# Patient Record
Sex: FEMALE | Race: WHITE | NOT HISPANIC OR LATINO
[De-identification: names, ages, dates, MRNs, and addresses within clinical notes are randomized per-mention and may not be internally consistent; named-entity substitution may affect disease eponyms.]

---

## 2019-01-07 ENCOUNTER — RECORD ABSTRACTING (OUTPATIENT)
Age: 69
End: 2019-01-07

## 2019-01-07 DIAGNOSIS — Z82.49 FAMILY HISTORY OF ISCHEMIC HEART DISEASE AND OTHER DISEASES OF THE CIRCULATORY SYSTEM: ICD-10-CM

## 2019-01-07 DIAGNOSIS — Z86.19 PERSONAL HISTORY OF OTHER INFECTIOUS AND PARASITIC DISEASES: ICD-10-CM

## 2019-01-07 DIAGNOSIS — Z87.09 PERSONAL HISTORY OF OTHER DISEASES OF THE RESPIRATORY SYSTEM: ICD-10-CM

## 2019-01-07 DIAGNOSIS — Z82.0 FAMILY HISTORY OF EPILEPSY AND OTHER DISEASES OF THE NERVOUS SYSTEM: ICD-10-CM

## 2019-01-07 DIAGNOSIS — K46.9 UNSPECIFIED ABDOMINAL HERNIA W/OUT OBSTRUCTION OR GANGRENE: ICD-10-CM

## 2019-01-07 DIAGNOSIS — Z86.718 PERSONAL HISTORY OF OTHER VENOUS THROMBOSIS AND EMBOLISM: ICD-10-CM

## 2019-01-07 DIAGNOSIS — R60.9 EDEMA, UNSPECIFIED: ICD-10-CM

## 2019-01-07 DIAGNOSIS — Z87.01 PERSONAL HISTORY OF PNEUMONIA (RECURRENT): ICD-10-CM

## 2019-01-07 DIAGNOSIS — Z87.19 PERSONAL HISTORY OF OTHER DISEASES OF THE DIGESTIVE SYSTEM: ICD-10-CM

## 2019-01-07 DIAGNOSIS — Z86.39 PERSONAL HISTORY OF OTHER ENDOCRINE, NUTRITIONAL AND METABOLIC DISEASE: ICD-10-CM

## 2019-01-07 DIAGNOSIS — H40.9 UNSPECIFIED GLAUCOMA: ICD-10-CM

## 2019-01-07 DIAGNOSIS — Z78.9 OTHER SPECIFIED HEALTH STATUS: ICD-10-CM

## 2019-01-07 DIAGNOSIS — Z86.72 PERSONAL HISTORY OF THROMBOPHLEBITIS: ICD-10-CM

## 2019-01-07 RX ORDER — LATANOPROST/PF 0.005 %
0.01 DROPS OPHTHALMIC (EYE)
Refills: 0 | Status: ACTIVE | COMMUNITY

## 2019-01-07 RX ORDER — PANTOPRAZOLE 40 MG/1
TABLET, DELAYED RELEASE ORAL
Refills: 0 | Status: ACTIVE | COMMUNITY

## 2019-01-07 RX ORDER — TIMOLOL MALEATE 5 MG/ML
SOLUTION/ DROPS OPHTHALMIC
Refills: 0 | Status: ACTIVE | COMMUNITY

## 2019-01-07 RX ORDER — ZOLPIDEM TARTRATE 5 MG/1
5 TABLET ORAL
Refills: 0 | Status: ACTIVE | COMMUNITY

## 2019-01-17 ENCOUNTER — APPOINTMENT (OUTPATIENT)
Dept: INTERNAL MEDICINE | Facility: CLINIC | Age: 69
End: 2019-01-17
Payer: MEDICARE

## 2019-01-17 VITALS
WEIGHT: 188.7 LBS | DIASTOLIC BLOOD PRESSURE: 90 MMHG | BODY MASS INDEX: 31.44 KG/M2 | HEART RATE: 59 BPM | HEIGHT: 65 IN | SYSTOLIC BLOOD PRESSURE: 170 MMHG

## 2019-01-17 VITALS — DIASTOLIC BLOOD PRESSURE: 70 MMHG | SYSTOLIC BLOOD PRESSURE: 126 MMHG

## 2019-01-17 DIAGNOSIS — Z00.00 ENCOUNTER FOR GENERAL ADULT MEDICAL EXAMINATION W/OUT ABNORMAL FINDINGS: ICD-10-CM

## 2019-01-17 DIAGNOSIS — K21.9 GASTRO-ESOPHAGEAL REFLUX DISEASE W/OUT ESOPHAGITIS: ICD-10-CM

## 2019-01-17 DIAGNOSIS — G47.00 INSOMNIA, UNSPECIFIED: ICD-10-CM

## 2019-01-17 DIAGNOSIS — I10 ESSENTIAL (PRIMARY) HYPERTENSION: ICD-10-CM

## 2019-01-17 PROCEDURE — 93000 ELECTROCARDIOGRAM COMPLETE: CPT

## 2019-01-17 PROCEDURE — G0444 DEPRESSION SCREEN ANNUAL: CPT | Mod: 59

## 2019-01-17 PROCEDURE — G0439: CPT

## 2019-01-17 PROCEDURE — 36415 COLL VENOUS BLD VENIPUNCTURE: CPT

## 2019-01-17 RX ORDER — ZOLPIDEM TARTRATE 5 MG/1
5 TABLET ORAL
Qty: 30 | Refills: 2 | Status: ACTIVE | COMMUNITY
Start: 2019-01-17 | End: 1900-01-01

## 2019-01-17 RX ORDER — METOPROLOL SUCCINATE 25 MG/1
25 TABLET, EXTENDED RELEASE ORAL
Refills: 0 | Status: ACTIVE | COMMUNITY

## 2019-01-17 RX ORDER — HYDROCHLOROTHIAZIDE 25 MG/1
25 TABLET ORAL
Qty: 90 | Refills: 2 | Status: DISCONTINUED | COMMUNITY
End: 2019-01-17

## 2019-01-17 NOTE — HEALTH RISK ASSESSMENT
[Very Good] : ~his/her~  mood as very good [One fall no injury in past year] : Patient reported one fall in the past year without injury [0] : 2) Feeling down, depressed, or hopeless: Not at all (0) [Patient reported mammogram was normal] : Patient reported mammogram was normal [Patient reported PAP Smear was normal] : Patient reported PAP Smear was normal [Patient reported bone density results were abnormal] : Patient reported bone density results were abnormal [Patient reported colonoscopy was normal] : Patient reported colonoscopy was normal [None] : None [With Significant Other] : lives with significant other [] :  [Fully functional (bathing, dressing, toileting, transferring, walking, feeding)] : Fully functional (bathing, dressing, toileting, transferring, walking, feeding) [Fully functional (using the telephone, shopping, preparing meals, housekeeping, doing laundry, using] : Fully functional and needs no help or supervision to perform IADLs (using the telephone, shopping, preparing meals, housekeeping, doing laundry, using transportation, managing medications and managing finances) [Smoke Detector] : smoke detector [Carbon Monoxide Detector] : carbon monoxide detector [Safety elements used in home] : safety elements used in home [Seat Belt] :  uses seat belt [Sunscreen] : uses sunscreen [Discussed at today's visit] : Advance Directives Discussed at today's visit [] : No [de-identified] : gyn--silvano tucker-aida [de-identified] : social [[id number]] : 0 [Change in mental status noted] : No change in mental status noted [Language] : denies difficulty with language [Behavior] : denies difficulty with behavior [Learning/Retaining New Information] : denies difficulty learning/retaining new information [Handling Complex Tasks] : denies difficulty handling complex tasks [Reasoning] : denies difficulty with reasoning [Spatial Ability and Orientation] : denies difficulty with spatial ability and orientation [Reports changes in hearing] : Reports no changes in hearing [Reports changes in vision] : Reports no changes in vision [Reports changes in dental health] : Reports no changes in dental health [Guns at Home] : no guns at home [Travel to Developing Areas] : does not  travel to developing areas [TB Exposure] : is not being exposed to tuberculosis [Caregiver Concerns] : does not have caregiver concerns [MammogramDate] : 12/18 [PapSmearDate] : 11/18 [BoneDensityDate] : 12/18 [ColonoscopyDate] : 01/14 [ColonoscopyComments] : edgar

## 2019-01-17 NOTE — PHYSICAL EXAM

## 2019-01-18 LAB
ALBUMIN SERPL ELPH-MCNC: 4.2 G/DL
ALP BLD-CCNC: 109 U/L
ALT SERPL-CCNC: 7 U/L
ANION GAP SERPL CALC-SCNC: 15 MMOL/L
APPEARANCE: CLEAR
AST SERPL-CCNC: 19 U/L
BACTERIA: NEGATIVE
BASOPHILS # BLD AUTO: 0.05 K/UL
BASOPHILS NFR BLD AUTO: 0.8 %
BILIRUB SERPL-MCNC: 0.7 MG/DL
BILIRUBIN URINE: NEGATIVE
BLOOD URINE: ABNORMAL
BUN SERPL-MCNC: 8 MG/DL
CALCIUM SERPL-MCNC: 9.6 MG/DL
CHLORIDE SERPL-SCNC: 102 MMOL/L
CHOLEST SERPL-MCNC: 195 MG/DL
CHOLEST/HDLC SERPL: 2 RATIO
CO2 SERPL-SCNC: 23 MMOL/L
COLOR: YELLOW
CREAT SERPL-MCNC: 0.69 MG/DL
EOSINOPHIL # BLD AUTO: 0.33 K/UL
EOSINOPHIL NFR BLD AUTO: 5 %
GLUCOSE QUALITATIVE U: NEGATIVE MG/DL
GLUCOSE SERPL-MCNC: 90 MG/DL
HBA1C MFR BLD HPLC: 5.4 %
HCT VFR BLD CALC: 41.3 %
HDLC SERPL-MCNC: 98 MG/DL
HGB BLD-MCNC: 13.5 G/DL
HYALINE CASTS: 2 /LPF
IMM GRANULOCYTES NFR BLD AUTO: 0.3 %
KETONES URINE: NEGATIVE
LDLC SERPL CALC-MCNC: 79 MG/DL
LEUKOCYTE ESTERASE URINE: NEGATIVE
LYMPHOCYTES # BLD AUTO: 1.59 K/UL
LYMPHOCYTES NFR BLD AUTO: 24.2 %
MAN DIFF?: NORMAL
MCHC RBC-ENTMCNC: 32.7 GM/DL
MCHC RBC-ENTMCNC: 32.9 PG
MCV RBC AUTO: 100.7 FL
MICROSCOPIC-UA: NORMAL
MONOCYTES # BLD AUTO: 0.45 K/UL
MONOCYTES NFR BLD AUTO: 6.8 %
NEUTROPHILS # BLD AUTO: 4.13 K/UL
NEUTROPHILS NFR BLD AUTO: 62.9 %
NITRITE URINE: NEGATIVE
PH URINE: 7
PLATELET # BLD AUTO: 288 K/UL
POTASSIUM SERPL-SCNC: 4.3 MMOL/L
PROT SERPL-MCNC: 7.1 G/DL
PROTEIN URINE: NEGATIVE MG/DL
RBC # BLD: 4.1 M/UL
RBC # FLD: 15.1 %
RED BLOOD CELLS URINE: 1 /HPF
SODIUM SERPL-SCNC: 140 MMOL/L
SPECIFIC GRAVITY URINE: 1
SQUAMOUS EPITHELIAL CELLS: 0 /HPF
TRIGL SERPL-MCNC: 90 MG/DL
TSH SERPL-ACNC: 3.17 UIU/ML
UROBILINOGEN URINE: NEGATIVE MG/DL
WBC # FLD AUTO: 6.57 K/UL
WHITE BLOOD CELLS URINE: 0 /HPF

## 2019-03-01 ENCOUNTER — RX RENEWAL (OUTPATIENT)
Age: 69
End: 2019-03-01

## 2019-03-01 RX ORDER — METOPROLOL SUCCINATE 25 MG/1
25 TABLET, EXTENDED RELEASE ORAL
Qty: 90 | Refills: 2 | Status: ACTIVE | COMMUNITY
Start: 2019-03-01 | End: 1900-01-01

## 2019-03-29 ENCOUNTER — RX RENEWAL (OUTPATIENT)
Age: 69
End: 2019-03-29

## 2019-03-29 RX ORDER — LOSARTAN POTASSIUM 100 MG/1
100 TABLET, FILM COATED ORAL DAILY
Qty: 90 | Refills: 2 | Status: ACTIVE | COMMUNITY
Start: 2019-03-29 | End: 1900-01-01

## 2019-04-22 ENCOUNTER — APPOINTMENT (OUTPATIENT)
Dept: INTERNAL MEDICINE | Facility: CLINIC | Age: 69
End: 2019-04-22

## 2019-04-25 ENCOUNTER — APPOINTMENT (OUTPATIENT)
Dept: INTERNAL MEDICINE | Facility: CLINIC | Age: 69
End: 2019-04-25

## 2019-06-10 ENCOUNTER — RX RENEWAL (OUTPATIENT)
Age: 69
End: 2019-06-10

## 2019-07-22 ENCOUNTER — RX RENEWAL (OUTPATIENT)
Age: 69
End: 2019-07-22

## 2019-08-16 ENCOUNTER — RX RENEWAL (OUTPATIENT)
Age: 69
End: 2019-08-16

## 2019-08-16 RX ORDER — PANTOPRAZOLE 40 MG/1
40 TABLET, DELAYED RELEASE ORAL DAILY
Qty: 90 | Refills: 0 | Status: ACTIVE | COMMUNITY
Start: 2019-06-10 | End: 1900-01-01

## 2019-12-02 ENCOUNTER — RX RENEWAL (OUTPATIENT)
Age: 69
End: 2019-12-02

## 2020-01-06 ENCOUNTER — RX RENEWAL (OUTPATIENT)
Age: 70
End: 2020-01-06

## 2020-12-11 ENCOUNTER — TRANSCRIPTION ENCOUNTER (OUTPATIENT)
Age: 70
End: 2020-12-11

## 2021-01-18 ENCOUNTER — APPOINTMENT (OUTPATIENT)
Dept: DISASTER EMERGENCY | Facility: CLINIC | Age: 71
End: 2021-01-18

## 2021-01-18 DIAGNOSIS — Z01.818 ENCOUNTER FOR OTHER PREPROCEDURAL EXAMINATION: ICD-10-CM

## 2021-01-20 LAB — SARS-COV-2 N GENE NPH QL NAA+PROBE: NOT DETECTED

## 2021-01-21 ENCOUNTER — INPATIENT (INPATIENT)
Facility: HOSPITAL | Age: 71
LOS: 3 days | Discharge: HOME | DRG: 310 | End: 2021-01-25
Attending: HOSPITALIST | Admitting: INTERNAL MEDICINE
Payer: MEDICARE

## 2021-01-21 VITALS
TEMPERATURE: 97 F | OXYGEN SATURATION: 100 % | DIASTOLIC BLOOD PRESSURE: 99 MMHG | RESPIRATION RATE: 20 BRPM | HEART RATE: 112 BPM | HEIGHT: 64 IN | SYSTOLIC BLOOD PRESSURE: 152 MMHG | WEIGHT: 199.08 LBS

## 2021-01-21 DIAGNOSIS — Z98.890 OTHER SPECIFIED POSTPROCEDURAL STATES: Chronic | ICD-10-CM

## 2021-01-21 DIAGNOSIS — I48.91 UNSPECIFIED ATRIAL FIBRILLATION: Chronic | ICD-10-CM

## 2021-01-21 DIAGNOSIS — I48.91 UNSPECIFIED ATRIAL FIBRILLATION: ICD-10-CM

## 2021-01-21 DIAGNOSIS — Z98.84 BARIATRIC SURGERY STATUS: Chronic | ICD-10-CM

## 2021-01-21 LAB
ALBUMIN SERPL ELPH-MCNC: 3 G/DL — LOW (ref 3.3–5)
ALP SERPL-CCNC: 66 U/L — SIGNIFICANT CHANGE UP (ref 40–120)
ALT FLD-CCNC: 16 U/L — SIGNIFICANT CHANGE UP (ref 12–78)
ANION GAP SERPL CALC-SCNC: 3 MMOL/L — LOW (ref 5–17)
AST SERPL-CCNC: 12 U/L — LOW (ref 15–37)
BILIRUB SERPL-MCNC: 0.5 MG/DL — SIGNIFICANT CHANGE UP (ref 0.2–1.2)
BUN SERPL-MCNC: 10 MG/DL — SIGNIFICANT CHANGE UP (ref 7–23)
CALCIUM SERPL-MCNC: 8 MG/DL — LOW (ref 8.5–10.1)
CHLORIDE SERPL-SCNC: 109 MMOL/L — HIGH (ref 96–108)
CO2 SERPL-SCNC: 28 MMOL/L — SIGNIFICANT CHANGE UP (ref 22–31)
CREAT SERPL-MCNC: 0.8 MG/DL — SIGNIFICANT CHANGE UP (ref 0.5–1.3)
GLUCOSE SERPL-MCNC: 97 MG/DL — SIGNIFICANT CHANGE UP (ref 70–99)
HCT VFR BLD CALC: 36.1 % — SIGNIFICANT CHANGE UP (ref 34.5–45)
HGB BLD-MCNC: 11.8 G/DL — SIGNIFICANT CHANGE UP (ref 11.5–15.5)
MCHC RBC-ENTMCNC: 32.7 GM/DL — SIGNIFICANT CHANGE UP (ref 32–36)
MCHC RBC-ENTMCNC: 33.1 PG — SIGNIFICANT CHANGE UP (ref 27–34)
MCV RBC AUTO: 101.1 FL — HIGH (ref 80–100)
PLATELET # BLD AUTO: 251 K/UL — SIGNIFICANT CHANGE UP (ref 150–400)
POTASSIUM SERPL-MCNC: 4.4 MMOL/L — SIGNIFICANT CHANGE UP (ref 3.5–5.3)
POTASSIUM SERPL-SCNC: 4.4 MMOL/L — SIGNIFICANT CHANGE UP (ref 3.5–5.3)
PROT SERPL-MCNC: 6.4 GM/DL — SIGNIFICANT CHANGE UP (ref 6–8.3)
RBC # BLD: 3.57 M/UL — LOW (ref 3.8–5.2)
RBC # FLD: 14.6 % — HIGH (ref 10.3–14.5)
SODIUM SERPL-SCNC: 140 MMOL/L — SIGNIFICANT CHANGE UP (ref 135–145)
WBC # BLD: 6.56 K/UL — SIGNIFICANT CHANGE UP (ref 3.8–10.5)
WBC # FLD AUTO: 6.56 K/UL — SIGNIFICANT CHANGE UP (ref 3.8–10.5)

## 2021-01-21 PROCEDURE — 94640 AIRWAY INHALATION TREATMENT: CPT

## 2021-01-21 PROCEDURE — 99223 1ST HOSP IP/OBS HIGH 75: CPT

## 2021-01-21 PROCEDURE — 93010 ELECTROCARDIOGRAM REPORT: CPT | Mod: 76

## 2021-01-21 PROCEDURE — 85025 COMPLETE CBC W/AUTO DIFF WBC: CPT

## 2021-01-21 PROCEDURE — 82746 ASSAY OF FOLIC ACID SERUM: CPT

## 2021-01-21 PROCEDURE — 84100 ASSAY OF PHOSPHORUS: CPT

## 2021-01-21 PROCEDURE — 83036 HEMOGLOBIN GLYCOSYLATED A1C: CPT

## 2021-01-21 PROCEDURE — 86769 SARS-COV-2 COVID-19 ANTIBODY: CPT

## 2021-01-21 PROCEDURE — 36415 COLL VENOUS BLD VENIPUNCTURE: CPT

## 2021-01-21 PROCEDURE — 93005 ELECTROCARDIOGRAM TRACING: CPT

## 2021-01-21 PROCEDURE — 80048 BASIC METABOLIC PNL TOTAL CA: CPT

## 2021-01-21 PROCEDURE — 86803 HEPATITIS C AB TEST: CPT

## 2021-01-21 PROCEDURE — 83735 ASSAY OF MAGNESIUM: CPT

## 2021-01-21 PROCEDURE — 84443 ASSAY THYROID STIM HORMONE: CPT

## 2021-01-21 PROCEDURE — 82607 VITAMIN B-12: CPT

## 2021-01-21 PROCEDURE — 84484 ASSAY OF TROPONIN QUANT: CPT

## 2021-01-21 RX ORDER — FOLIC ACID 0.8 MG
1 TABLET ORAL DAILY
Refills: 0 | Status: DISCONTINUED | OUTPATIENT
Start: 2021-01-21 | End: 2021-01-25

## 2021-01-21 RX ORDER — PANTOPRAZOLE SODIUM 20 MG/1
40 TABLET, DELAYED RELEASE ORAL ONCE
Refills: 0 | Status: DISCONTINUED | OUTPATIENT
Start: 2021-01-21 | End: 2021-01-21

## 2021-01-21 RX ORDER — FERROUS SULFATE 325(65) MG
1 TABLET ORAL
Qty: 0 | Refills: 0 | DISCHARGE

## 2021-01-21 RX ORDER — ZOLPIDEM TARTRATE 10 MG/1
5 TABLET ORAL ONCE
Refills: 0 | Status: DISCONTINUED | OUTPATIENT
Start: 2021-01-21 | End: 2021-01-21

## 2021-01-21 RX ORDER — BUDESONIDE AND FORMOTEROL FUMARATE DIHYDRATE 160; 4.5 UG/1; UG/1
2 AEROSOL RESPIRATORY (INHALATION)
Refills: 0 | Status: DISCONTINUED | OUTPATIENT
Start: 2021-01-21 | End: 2021-01-25

## 2021-01-21 RX ORDER — ONDANSETRON 8 MG/1
4 TABLET, FILM COATED ORAL ONCE
Refills: 0 | Status: DISCONTINUED | OUTPATIENT
Start: 2021-01-21 | End: 2021-01-25

## 2021-01-21 RX ORDER — ZOLPIDEM TARTRATE 10 MG/1
5 TABLET ORAL AT BEDTIME
Refills: 0 | Status: DISCONTINUED | OUTPATIENT
Start: 2021-01-21 | End: 2021-01-25

## 2021-01-21 RX ORDER — APIXABAN 2.5 MG/1
5 TABLET, FILM COATED ORAL EVERY 12 HOURS
Refills: 0 | Status: DISCONTINUED | OUTPATIENT
Start: 2021-01-21 | End: 2021-01-25

## 2021-01-21 RX ORDER — METOPROLOL TARTRATE 50 MG
150 TABLET ORAL ONCE
Refills: 0 | Status: DISCONTINUED | OUTPATIENT
Start: 2021-01-21 | End: 2021-01-21

## 2021-01-21 RX ORDER — LATANOPROST 0.05 MG/ML
1 SOLUTION/ DROPS OPHTHALMIC; TOPICAL AT BEDTIME
Refills: 0 | Status: DISCONTINUED | OUTPATIENT
Start: 2021-01-21 | End: 2021-01-25

## 2021-01-21 RX ORDER — DOFETILIDE 0.25 MG/1
250 CAPSULE ORAL EVERY 12 HOURS
Refills: 0 | Status: DISCONTINUED | OUTPATIENT
Start: 2021-01-21 | End: 2021-01-25

## 2021-01-21 RX ORDER — METOPROLOL TARTRATE 50 MG
150 TABLET ORAL DAILY
Refills: 0 | Status: DISCONTINUED | OUTPATIENT
Start: 2021-01-21 | End: 2021-01-25

## 2021-01-21 RX ORDER — LATANOPROST 0.05 MG/ML
1 SOLUTION/ DROPS OPHTHALMIC; TOPICAL ONCE
Refills: 0 | Status: DISCONTINUED | OUTPATIENT
Start: 2021-01-21 | End: 2021-01-21

## 2021-01-21 RX ORDER — VALSARTAN 80 MG/1
160 TABLET ORAL DAILY
Refills: 0 | Status: DISCONTINUED | OUTPATIENT
Start: 2021-01-21 | End: 2021-01-25

## 2021-01-21 RX ORDER — BUDESONIDE AND FORMOTEROL FUMARATE DIHYDRATE 160; 4.5 UG/1; UG/1
2 AEROSOL RESPIRATORY (INHALATION) ONCE
Refills: 0 | Status: DISCONTINUED | OUTPATIENT
Start: 2021-01-21 | End: 2021-01-21

## 2021-01-21 RX ORDER — PANTOPRAZOLE SODIUM 20 MG/1
40 TABLET, DELAYED RELEASE ORAL
Refills: 0 | Status: DISCONTINUED | OUTPATIENT
Start: 2021-01-21 | End: 2021-01-25

## 2021-01-21 RX ORDER — ACETAMINOPHEN 500 MG
650 TABLET ORAL ONCE
Refills: 0 | Status: DISCONTINUED | OUTPATIENT
Start: 2021-01-21 | End: 2021-01-25

## 2021-01-21 RX ADMIN — LATANOPROST 1 DROP(S): 0.05 SOLUTION/ DROPS OPHTHALMIC; TOPICAL at 22:25

## 2021-01-21 RX ADMIN — ZOLPIDEM TARTRATE 5 MILLIGRAM(S): 10 TABLET ORAL at 22:25

## 2021-01-21 RX ADMIN — APIXABAN 5 MILLIGRAM(S): 2.5 TABLET, FILM COATED ORAL at 22:23

## 2021-01-21 RX ADMIN — DOFETILIDE 250 MICROGRAM(S): 0.25 CAPSULE ORAL at 19:25

## 2021-01-21 NOTE — H&P ADULT - NSHPLABSRESULTS_GEN_ALL_CORE
11.8   6.56  )-----------( 251      ( 21 Jan 2021 11:14 )             36.1     01-21    140  |  109<H>  |  10  ----------------------------<  97  4.4   |  28  |  0.80    Ca    8.0<L>      21 Jan 2021 11:14  Mg     2.1     01-21    TPro  6.4  /  Alb  3.0<L>  /  TBili  0.5  /  DBili  x   /  AST  12<L>  /  ALT  16  /  AlkPhos  66  01-21    CAPILLARY BLOOD GLUCOSE

## 2021-01-21 NOTE — CONSULT NOTE ADULT - ASSESSMENT
Afib with RVR - in SR  Toprol xl decreased to once daily   hold tomorrow am dose if she is bradycardic.  Tikosyn to be started tonight.  Spoke with Dr Up  close monitoring of the renal function and electrolytes.  Goal potassium of 4 and magnesium of 2.     HTN- bp stable.    Other medical issues- Management per primary team.   Thank you for allowing me to participate in the care of this patient. Please feel free to contact me with any questions.

## 2021-01-21 NOTE — CONSULT NOTE ADULT - SUBJECTIVE AND OBJECTIVE BOX
Patient is a 70y old  Female who presents with a chief complaint of AFIB cardioversion.       HPI:  71 y/o female with PMHX of PAF on eliquis, HTN, HLD, and obesity s/p gastric bypass surgery who presented to  for planned LAZ cardioversion for AFIB.  Patient was recently dx with AFIB in 2021; however, patient has been having ongoing palpitations for the last few months.   Pt was evaluated by me as outpt and she was poorly rate controlled with max dose rate control agents.    Pt this am was shocked with 150J and she revered back to afib again and underwent another 150J CV.  Spoke with EP team to evaluate her for tikosyn load.    N oCP or SOB.       PAST MEDICAL & SURGICAL HISTORY:  GERD (gastroesophageal reflux disease)  Macular degeneration  Phlebitis and thrombophlebitis of lower extremity  Glaucoma  Hyperlipidemia, unspecified hyperlipidemia type  Essential hypertension  History of breast lift  H/O abdominoplasty  History of ovarian cystectomy  Atrial fibrillation status post cardioversion  H/O vein stripping  History of gastric bypass    FAMILY HISTORY:  Mother:   young age cerebral hemorrhage  Father:   in 60's-- extensive hx of CAD starting in 40's.      Social History:    Rare wine intake  No tob  Lives at home with family    Allergies:    fentanyl (Other)  narcotic analgesics (Other)    Home Medications:  albuterol:  (2021 12:56)  Eliquis 5 mg oral tablet: 1 tab(s) orally 2 times a day (2021 12:56)  folic acid:  (2021 12:56)  latanoprost 0.005% ophthalmic solution: 1 drop(s) to each affected eye once a day (in the evening) (2021 12:56)  magnesium:  (2021 12:56)  Metoprolol Succinate ER: 150 milligram(s) orally 2 times a day (2021 12:56)  pantoprazole 40 mg oral delayed release tablet: 1 tab(s) orally once a day (2021 12:56)  Prolia 60 mg/mL subcutaneous solution: subcutaneous every 6 months (2021 12:56)  Symbicort 160 mcg-4.5 mcg/inh inhalation aerosol: 2 puff(s) inhaled 2 times a day, As Needed (2021 12:56)  valsartan 160 mg oral tablet: 1 tab(s) orally once a day (2021 12:56)  Vitamin B12: orally once a day (2021 12:56)  Vitamin D3 1000 intl units (25 mcg) oral tablet: orally once a day (2021 12:56)  Zinc: orally once a day (2021 12:56)  zolpidem 5 mg oral tablet: 1 tab(s) orally once a day (at bedtime), As Needed (2021 12:56)     (2021 13:53)      PAST MEDICAL & SURGICAL HISTORY:  GERD (gastroesophageal reflux disease)    Macular degeneration    Phlebitis and thrombophlebitis of lower extremity    Glaucoma    Hyperlipidemia, unspecified hyperlipidemia type    Essential hypertension    History of breast lift    H/O abdominoplasty    History of ovarian cystectomy    Atrial fibrillation status post cardioversion    H/O vein stripping    History of gastric bypass        MEDICATIONS  (STANDING):  apixaban 5 milliGRAM(s) Oral every 12 hours  budesonide 160 MICROgram(s)/formoterol 4.5 MICROgram(s) Inhaler 2 Puff(s) Inhalation two times a day  dofetilide 250 MICROGram(s) Oral every 12 hours  folic acid 1 milliGRAM(s) Oral daily  latanoprost 0.005% Ophthalmic Solution 1 Drop(s) Both EYES at bedtime  metoprolol succinate  milliGRAM(s) Oral daily  pantoprazole    Tablet 40 milliGRAM(s) Oral before breakfast  valsartan 160 milliGRAM(s) Oral daily    MEDICATIONS  (PRN):  acetaminophen   Tablet .. 650 milliGRAM(s) Oral Once PRN Mild Pain (1 - 3)  aluminum hydroxide/magnesium hydroxide/simethicone Suspension 30 milliLiter(s) Oral Once PRN Dyspepsia  ondansetron Injectable 4 milliGRAM(s) IV Push Once PRN Nausea  zolpidem 5 milliGRAM(s) Oral at bedtime PRN Insomnia      FAMILY HISTORY:      SOCIAL HISTORY: no recent smoking     REVIEW OF SYSTEMS:  CONSTITUTIONAL:    No fatigue, malaise, lethargy.  No fever or chills.  RESPIRATORY:  No cough.  No wheeze.  No hemoptysis.  No shortness of breath.  CARDIOVASCULAR:  No chest pains.  No palpitations. No shortness of breath, No orthopnea or PND.  GASTROINTESTINAL:  No abdominal pain.  No nausea or vomiting.    GENITOURINARY:    No hematuria.    MUSCULOSKELETAL:  No musculoskeletal pain.  No joint swelling.  No arthritis.  NEUROLOGICAL:  No tingling or numbness or weakness.  PSYCHIATRIC:  No confusion  SKIN:  No rashes.            Vital Signs Last 24 Hrs  T(C): 36.7 (2021 16:48), Max: 36.7 (2021 16:48)  T(F): 98 (2021 16:48), Max: 98 (2021 16:48)  HR: 68 (2021 16:48) (56 - 112)  BP: 128/83 (2021 16:48) (102/63 - 152/99)  BP(mean): --  RR: 18 (2021 16:48) (15 - 20)  SpO2: 98% (2021 16:48) (96% - 100%)    PHYSICAL EXAM-    Constitutional: no acute distress     Head: Head is normocephalic and atraumatic.      Neck: No jugular venous distention. No audible carotid bruits. There are strong carotid pulses bilaterally. No JVD.     Cardiovascular: Regular rate and rhythm without S3, S4. No murmurs or rubs are appreciated.      Respiratory: Breathsounds are normal. No rales. No wheezing.    Abdomen: Soft, nontender, nondistended with positive bowel sounds.      Extremity: No tenderness. No  pitting edema     Neurologic: The patient is alert and oriented.      Skin: No rash, no obvious lesions noted.      Psychiatric: The patient appears to be emotionally stable.      INTERPRETATION OF TELEMETRY: SR     ECG: Sinus rythm , first degree     I&O's Detail      LABS:                        11.8   6.56  )-----------( 251      ( 2021 11:14 )             36.1         140  |  109<H>  |  10  ----------------------------<  97  4.4   |  28  |  0.80    Ca    8.0<L>      2021 11:14  Mg     2.1         TPro  6.4  /  Alb  3.0<L>  /  TBili  0.5  /  DBili  x   /  AST  12<L>  /  ALT  16  /  AlkPhos  66              I&O's Summary    BNP  RADIOLOGY & ADDITIONAL STUDIES:

## 2021-01-21 NOTE — H&P ADULT - NSHPREVIEWOFSYSTEMS_GEN_ALL_CORE
ROS:  General:  No fevers, chills, or unexplained weight loss  Skin: No rash or bothersome skin lesions  Musculoskeletal: No arthalgias, myalgias or joint swelling  Eyes: No visual changes or eye pain  Ears: No hearing loss , otorrhea or ear pain  Nose, Mouth, Throat: No nasal congestion, rhinorrhea, oral lesions, postnasal drip or sore throat  Cardio: palpitations  Respiratory: LUO  GI: No diarrhea, constipation, blood in stools, abdominal pain, vomiting or heartburn  : No urinary frequency, hematuria, incontinence, or dysuria  Neurologic: No headaches, parasthesias, confusion, dysarthria or gait instability  Psychiatric:  No anxiety or depression  Lymphatic:  No easy bruising, easy bleeding or swollen glands  Allergic: No itching, sneezing , watery eyes, clear rhinorrhea or recurrent infections

## 2021-01-21 NOTE — H&P CARDIOLOGY - PSH
Atrial fibrillation status post cardioversion    H/O abdominoplasty    H/O vein stripping    History of breast lift    History of gastric bypass    History of ovarian cystectomy

## 2021-01-21 NOTE — H&P ADULT - NSHPPHYSICALEXAM_GEN_ALL_CORE
PEx  T(C): 36.3 (01-21-21 @ 08:42), Max: 36.3 (01-21-21 @ 08:42)  HR: 56 (01-21-21 @ 15:04) (56 - 112)  BP: 115/66 (01-21-21 @ 15:04) (102/63 - 152/99)  RR: 16 (01-21-21 @ 15:04) (15 - 20)  SpO2: 96% (01-21-21 @ 15:04) (96% - 100%)    General:     Well appearing, well nourished in no distress, no identifying marks , scars, or tattoos.  Skin: no rash or prominent lesions  Head: normocephalic, atraumatic     Sinuses: non-tender  Nose: no external lesions, mucosa non-inflamed, septum and turbinates normal  Throat: no erythema, exudates or lesions.  Neck: Supple without lymphadenopathy. Thyroid no thyromegaly, no palpable thyroid nodules, no palpable nodules or masses, carotid arteries no bruits.   Breasts: No palpable masses or lesions.  Heart: RRR, no murmur or gallop.  Normal S1, S2.  No S3, S4.   Lungs: CTA bilaterally, no wheezes, rhonchi, rales.  Breathing unlabored.   Chest wall: Normal insp   Abdomen:  Soft, NT/ND, normal bowel sounds, no HSM, no masses.  No peritoneal signs.   Back: spine normal without deformity or tenderness.  Normal ROM   : Exam normal.  no inguinal hernias.  Extremities:  1+ edema  Musculoskeletal: Normal gait and station. No decreased range of motion, instability, atrophy or abnormal strength or tone in the head, neck, spine, ribs, pelvis or extremities.   Neurologic: CN 2-12 normal. Sensation to pain, touch and proprioception normal. DTRs normal in upper and lower extremities. No pathologic reflexes.  Motor normal.  Psychiatric: Oriented X3, intact recent and remote memory, judgement and insight, normal mood and affect.

## 2021-01-21 NOTE — H&P ADULT - HISTORY OF PRESENT ILLNESS
69 y/o female with PMHX of PAF on eliquis, HTN, HLD, and obesity s/p gastric bypass surgery who presented to  for planned LAZ cardioversion for AFIB.  Patient was recently dx with AFIB in JAN 2021.      69 yo female PMH HTN, HLD, obesity h/o gastric bypass, new onset afib diagnosed in beginning of this month,  presented for outpatient LAZ guided cardioversion with Dr Godoy.  She reports for the past two months increased acid reflux and feeling chest pounding with sob and LUO.  She underwent LAZ/Cardioversion this morning with conversion to sinus rhythm.   EP consulted for evaluation for oral antiarrhythmic agent.    71 y/o female with PMHX of PAF on eliquis, HTN, HLD, and obesity s/p gastric bypass surgery who presented to  for planned LAZ cardioversion for AFIB.  Patient was recently dx with AFIB in 2021; however, patient has been having ongoing palpitations for the last few months.  Patient notes she has a hx of GERD and was having palpitations/ epigastric pain off and on for months.  Patient was also having intermittent SOB with exertion.  Patient ultimately found to have PAF and was started on Eliquis by Dr. Turner and was set up for LAZ/ Cardioversion due to persistent AFIB on rate control agents.  Patient had successful cardioversion this morning but EP was consulted with concern of recurrent AFIB.  Decision was made for admission for starting TIKOSYN.        PAST MEDICAL & SURGICAL HISTORY:  GERD (gastroesophageal reflux disease)  Macular degeneration  Phlebitis and thrombophlebitis of lower extremity  Glaucoma  Hyperlipidemia, unspecified hyperlipidemia type  Essential hypertension  History of breast lift  H/O abdominoplasty  History of ovarian cystectomy  Atrial fibrillation status post cardioversion  H/O vein stripping  History of gastric bypass    FAMILY HISTORY:  Mother:   young age cerebral hemorrhage  Father:   in 60's-- extensive hx of CAD starting in 40's.      Social History:    Rare wine intake  No tob  Lives at home with family    Allergies:    fentanyl (Other)  narcotic analgesics (Other)    Home Medications:  albuterol:  (2021 12:56)  Eliquis 5 mg oral tablet: 1 tab(s) orally 2 times a day (2021 12:56)  folic acid:  (2021 12:56)  latanoprost 0.005% ophthalmic solution: 1 drop(s) to each affected eye once a day (in the evening) (2021 12:56)  magnesium:  (2021 12:56)  Metoprolol Succinate ER: 150 milligram(s) orally 2 times a day (2021 12:56)  pantoprazole 40 mg oral delayed release tablet: 1 tab(s) orally once a day (2021 12:56)  Prolia 60 mg/mL subcutaneous solution: subcutaneous every 6 months (2021 12:56)  Symbicort 160 mcg-4.5 mcg/inh inhalation aerosol: 2 puff(s) inhaled 2 times a day, As Needed (2021 12:56)  valsartan 160 mg oral tablet: 1 tab(s) orally once a day (2021 12:56)  Vitamin B12: orally once a day (2021 12:56)  Vitamin D3 1000 intl units (25 mcg) oral tablet: orally once a day (2021 12:56)  Zinc: orally once a day (2021 12:56)  zolpidem 5 mg oral tablet: 1 tab(s) orally once a day (at bedtime), As Needed (2021 12:56)

## 2021-01-21 NOTE — H&P CARDIOLOGY - PMH
Essential hypertension    GERD (gastroesophageal reflux disease)    Glaucoma    Hyperlipidemia, unspecified hyperlipidemia type    Macular degeneration    Phlebitis and thrombophlebitis of lower extremity

## 2021-01-21 NOTE — H&P CARDIOLOGY - HISTORY OF PRESENT ILLNESS
69 yo female PMH HTN, HLD, obesity h/o gastric bypass, new onset afib diagnosed in beginning of this month,  presented for outpatient LAZ guided cardioversion with Dr Godoy.  She reports for the past two months increased acid reflux and feeling chest pounding with sob and LUO.  She underwent LAZ/Cardioversion this morning with conversion to sinus rhythm.   EP consulted for evaluation for oral antiarrhythmic agent.

## 2021-01-21 NOTE — H&P CARDIOLOGY - COMMENTS
71 yo female with HTN, HLD, h/o gastric bypass, new diagnosis of PAF in January 2021 (symptom onset possibly since november 2020) started on eliquis.  now s/p LAZ/Cardioversion, with conversion to sinus rhythm heart rate in 60s.   will recommend antiarrhythmic agent Tikosyn for sinus rhythm maintenance.   chadsvasc 3, continue Eliquis for stroke prophylaxis    reduce metoprolol to 150mg once daily in the morning  CBC/BMP/Mg stat - replete electrolytes as needed  BMP daily  EKG reviewed  admit to tele under Hospitalist service  will start Tikosyn 250mcg bid with EKG 2 hrs after each dose to monitor QT interval  call EP for dose adjustment if QTc greater than 500ms

## 2021-01-22 LAB
A1C WITH ESTIMATED AVERAGE GLUCOSE RESULT: 5.3 % — SIGNIFICANT CHANGE UP (ref 4–5.6)
ANION GAP SERPL CALC-SCNC: 5 MMOL/L — SIGNIFICANT CHANGE UP (ref 5–17)
BASOPHILS # BLD AUTO: 0.05 K/UL — SIGNIFICANT CHANGE UP (ref 0–0.2)
BASOPHILS NFR BLD AUTO: 0.7 % — SIGNIFICANT CHANGE UP (ref 0–2)
BUN SERPL-MCNC: 10 MG/DL — SIGNIFICANT CHANGE UP (ref 7–23)
CALCIUM SERPL-MCNC: 7.6 MG/DL — LOW (ref 8.5–10.1)
CHLORIDE SERPL-SCNC: 106 MMOL/L — SIGNIFICANT CHANGE UP (ref 96–108)
CO2 SERPL-SCNC: 25 MMOL/L — SIGNIFICANT CHANGE UP (ref 22–31)
CREAT SERPL-MCNC: 0.87 MG/DL — SIGNIFICANT CHANGE UP (ref 0.5–1.3)
EOSINOPHIL # BLD AUTO: 0.17 K/UL — SIGNIFICANT CHANGE UP (ref 0–0.5)
EOSINOPHIL NFR BLD AUTO: 2.5 % — SIGNIFICANT CHANGE UP (ref 0–6)
ESTIMATED AVERAGE GLUCOSE: 105 MG/DL — SIGNIFICANT CHANGE UP (ref 68–114)
FOLATE SERPL-MCNC: >20 NG/ML — SIGNIFICANT CHANGE UP
GLUCOSE SERPL-MCNC: 146 MG/DL — HIGH (ref 70–99)
HCT VFR BLD CALC: 35.7 % — SIGNIFICANT CHANGE UP (ref 34.5–45)
HCV AB S/CO SERPL IA: 0.89 S/CO — SIGNIFICANT CHANGE UP (ref 0–0.99)
HCV AB SERPL-IMP: SIGNIFICANT CHANGE UP
HGB BLD-MCNC: 11.6 G/DL — SIGNIFICANT CHANGE UP (ref 11.5–15.5)
IMM GRANULOCYTES NFR BLD AUTO: 0.1 % — SIGNIFICANT CHANGE UP (ref 0–1.5)
LYMPHOCYTES # BLD AUTO: 1.26 K/UL — SIGNIFICANT CHANGE UP (ref 1–3.3)
LYMPHOCYTES # BLD AUTO: 18.9 % — SIGNIFICANT CHANGE UP (ref 13–44)
MAGNESIUM SERPL-MCNC: 2 MG/DL — SIGNIFICANT CHANGE UP (ref 1.6–2.6)
MCHC RBC-ENTMCNC: 32.5 GM/DL — SIGNIFICANT CHANGE UP (ref 32–36)
MCHC RBC-ENTMCNC: 32.7 PG — SIGNIFICANT CHANGE UP (ref 27–34)
MCV RBC AUTO: 100.6 FL — HIGH (ref 80–100)
MONOCYTES # BLD AUTO: 0.56 K/UL — SIGNIFICANT CHANGE UP (ref 0–0.9)
MONOCYTES NFR BLD AUTO: 8.4 % — SIGNIFICANT CHANGE UP (ref 2–14)
NEUTROPHILS # BLD AUTO: 4.63 K/UL — SIGNIFICANT CHANGE UP (ref 1.8–7.4)
NEUTROPHILS NFR BLD AUTO: 69.4 % — SIGNIFICANT CHANGE UP (ref 43–77)
PHOSPHATE SERPL-MCNC: 2.5 MG/DL — SIGNIFICANT CHANGE UP (ref 2.5–4.5)
PLATELET # BLD AUTO: 210 K/UL — SIGNIFICANT CHANGE UP (ref 150–400)
POTASSIUM SERPL-MCNC: 3.8 MMOL/L — SIGNIFICANT CHANGE UP (ref 3.5–5.3)
POTASSIUM SERPL-SCNC: 3.8 MMOL/L — SIGNIFICANT CHANGE UP (ref 3.5–5.3)
RBC # BLD: 3.55 M/UL — LOW (ref 3.8–5.2)
RBC # FLD: 14.6 % — HIGH (ref 10.3–14.5)
SARS-COV-2 IGG SERPL QL IA: NEGATIVE — SIGNIFICANT CHANGE UP
SARS-COV-2 IGM SERPL IA-ACNC: 0.07 INDEX — SIGNIFICANT CHANGE UP
SODIUM SERPL-SCNC: 136 MMOL/L — SIGNIFICANT CHANGE UP (ref 135–145)
TROPONIN I SERPL-MCNC: <0.015 NG/ML — SIGNIFICANT CHANGE UP (ref 0.01–0.04)
TSH SERPL-MCNC: 2.1 UU/ML — SIGNIFICANT CHANGE UP (ref 0.34–4.82)
VIT B12 SERPL-MCNC: 1624 PG/ML — HIGH (ref 232–1245)
WBC # BLD: 6.68 K/UL — SIGNIFICANT CHANGE UP (ref 3.8–10.5)
WBC # FLD AUTO: 6.68 K/UL — SIGNIFICANT CHANGE UP (ref 3.8–10.5)

## 2021-01-22 PROCEDURE — 99233 SBSQ HOSP IP/OBS HIGH 50: CPT

## 2021-01-22 PROCEDURE — 93010 ELECTROCARDIOGRAM REPORT: CPT | Mod: 76

## 2021-01-22 RX ORDER — POTASSIUM CHLORIDE 20 MEQ
40 PACKET (EA) ORAL ONCE
Refills: 0 | Status: COMPLETED | OUTPATIENT
Start: 2021-01-22 | End: 2021-01-22

## 2021-01-22 RX ADMIN — Medication 150 MILLIGRAM(S): at 11:02

## 2021-01-22 RX ADMIN — DOFETILIDE 250 MICROGRAM(S): 0.25 CAPSULE ORAL at 19:07

## 2021-01-22 RX ADMIN — LATANOPROST 1 DROP(S): 0.05 SOLUTION/ DROPS OPHTHALMIC; TOPICAL at 21:33

## 2021-01-22 RX ADMIN — DOFETILIDE 250 MICROGRAM(S): 0.25 CAPSULE ORAL at 07:26

## 2021-01-22 RX ADMIN — VALSARTAN 160 MILLIGRAM(S): 80 TABLET ORAL at 11:02

## 2021-01-22 RX ADMIN — ZOLPIDEM TARTRATE 5 MILLIGRAM(S): 10 TABLET ORAL at 22:56

## 2021-01-22 RX ADMIN — Medication 40 MILLIEQUIVALENT(S): at 11:03

## 2021-01-22 RX ADMIN — APIXABAN 5 MILLIGRAM(S): 2.5 TABLET, FILM COATED ORAL at 11:01

## 2021-01-22 RX ADMIN — PANTOPRAZOLE SODIUM 40 MILLIGRAM(S): 20 TABLET, DELAYED RELEASE ORAL at 07:26

## 2021-01-22 RX ADMIN — Medication 1 MILLIGRAM(S): at 11:01

## 2021-01-22 RX ADMIN — APIXABAN 5 MILLIGRAM(S): 2.5 TABLET, FILM COATED ORAL at 21:33

## 2021-01-22 NOTE — PROGRESS NOTE ADULT - SUBJECTIVE AND OBJECTIVE BOX
Patient is a 70y old  Female who presents with a chief complaint of AFIB cardioversion.       HPI:  71 y/o female with PMHX of PAF on eliquis, HTN, HLD, and obesity s/p gastric bypass surgery who presented to  for planned LAZ cardioversion for AFIB.  Patient was recently dx with AFIB in 2021; however, patient has been having ongoing palpitations for the last few months.   Pt was evaluated by me as outpt and she was poorly rate controlled with max dose rate control agents.  -   Pt this am was shocked with 150J and she revered back to afib again and underwent another 150J CV.  Spoke with EP team to evaluate her for tikosyn load.    N oCP or SOB.     - pt seen this am.  No overnight events.  Recieved 2 D doses of tikosyn.    PAST MEDICAL & SURGICAL HISTORY:  GERD (gastroesophageal reflux disease)  Macular degeneration  Phlebitis and thrombophlebitis of lower extremity  Glaucoma  Hyperlipidemia, unspecified hyperlipidemia type  Essential hypertension  History of breast lift  H/O abdominoplasty  History of ovarian cystectomy  Atrial fibrillation status post cardioversion  H/O vein stripping  History of gastric bypass    FAMILY HISTORY:  Mother:   young age cerebral hemorrhage  Father:   in 60's-- extensive hx of CAD starting in 40's.      Social History:    Rare wine intake  No tob  Lives at home with family    Allergies:    fentanyl (Other)  narcotic analgesics (Other)    Home Medications:  albuterol:  (2021 12:56)  Eliquis 5 mg oral tablet: 1 tab(s) orally 2 times a day (2021 12:56)  folic acid:  (2021 12:56)  latanoprost 0.005% ophthalmic solution: 1 drop(s) to each affected eye once a day (in the evening) (2021 12:56)  magnesium:  (2021 12:56)  Metoprolol Succinate ER: 150 milligram(s) orally 2 times a day (2021 12:56)  pantoprazole 40 mg oral delayed release tablet: 1 tab(s) orally once a day (2021 12:56)  Prolia 60 mg/mL subcutaneous solution: subcutaneous every 6 months (2021 12:56)  Symbicort 160 mcg-4.5 mcg/inh inhalation aerosol: 2 puff(s) inhaled 2 times a day, As Needed (2021 12:56)  valsartan 160 mg oral tablet: 1 tab(s) orally once a day (2021 12:56)  Vitamin B12: orally once a day (2021 12:56)  Vitamin D3 1000 intl units (25 mcg) oral tablet: orally once a day (2021 12:56)  Zinc: orally once a day (2021 12:56)  zolpidem 5 mg oral tablet: 1 tab(s) orally once a day (at bedtime), As Needed (2021 12:56)          PAST MEDICAL & SURGICAL HISTORY:  GERD (gastroesophageal reflux disease)    Macular degeneration    Phlebitis and thrombophlebitis of lower extremity    Glaucoma    Hyperlipidemia, unspecified hyperlipidemia type    Essential hypertension    History of breast lift    H/O abdominoplasty    History of ovarian cystectomy    Atrial fibrillation status post cardioversion    H/O vein stripping    History of gastric bypass        MEDICATIONS  (STANDING):  apixaban 5 milliGRAM(s) Oral every 12 hours  budesonide 160 MICROgram(s)/formoterol 4.5 MICROgram(s) Inhaler 2 Puff(s) Inhalation two times a day  dofetilide 250 MICROGram(s) Oral every 12 hours  folic acid 1 milliGRAM(s) Oral daily  latanoprost 0.005% Ophthalmic Solution 1 Drop(s) Both EYES at bedtime  metoprolol succinate  milliGRAM(s) Oral daily  pantoprazole    Tablet 40 milliGRAM(s) Oral before breakfast  valsartan 160 milliGRAM(s) Oral daily    MEDICATIONS  (PRN):  acetaminophen   Tablet .. 650 milliGRAM(s) Oral Once PRN Mild Pain (1 - 3)  aluminum hydroxide/magnesium hydroxide/simethicone Suspension 30 milliLiter(s) Oral Once PRN Dyspepsia  ondansetron Injectable 4 milliGRAM(s) IV Push Once PRN Nausea  zolpidem 5 milliGRAM(s) Oral at bedtime PRN Insomnia      FAMILY HISTORY:      SOCIAL HISTORY: no recent smoking     REVIEW OF SYSTEMS:  CONSTITUTIONAL:    No fatigue, malaise, lethargy.  No fever or chills.  RESPIRATORY:  No cough.  No wheeze.  No hemoptysis.  No shortness of breath.  CARDIOVASCULAR:  No chest pains.  No palpitations. No shortness of breath, No orthopnea or PND.  GASTROINTESTINAL:  No abdominal pain.  No nausea or vomiting.    GENITOURINARY:    No hematuria.    MUSCULOSKELETAL:  No musculoskeletal pain.  No joint swelling.  No arthritis.  NEUROLOGICAL:  No tingling or numbness or weakness.  PSYCHIATRIC:  No confusion  SKIN:  No rashes.            ICU Vital Signs Last 24 Hrs  T(C): 36.2 (2021 08:51), Max: 36.7 (2021 16:48)  T(F): 97.2 (2021 08:51), Max: 98.1 (2021 19:47)  HR: 65 (2021 08:51) (56 - 68)  BP: 120/80 (2021 08:51) (107/53 - 128/83)  BP(mean): 66 (2021 04:32) (66 - 66)  ABP: --  ABP(mean): --  RR: 18 (2021 08:51) (16 - 18)  SpO2: 96% (2021 08:51) (94% - 98%)      PHYSICAL EXAM-    Constitutional: no acute distress     Head: Head is normocephalic and atraumatic.      Neck: No jugular venous distention. No audible carotid bruits. There are strong carotid pulses bilaterally. No JVD.     Cardiovascular: Regular rate and rhythm without S3, S4. No murmurs or rubs are appreciated.      Respiratory: Breath sounds are normal. No rales. No wheezing.    Abdomen: Soft, nontender, nondistended with positive bowel sounds.      Extremity: No tenderness. No  pitting edema     Neurologic: The patient is alert and oriented.      Skin: No rash, no obvious lesions noted.      Psychiatric: The patient appears to be emotionally stable.      INTERPRETATION OF TELEMETRY: SR     ECG: Sinus rythm , first degree     I&O's Detail      LABS:                             11.6   6.68  )-----------( 210      ( 2021 08:39 )             35.7     01-    136  |  106  |  10  ----------------------------<  146<H>  3.8   |  25  |  0.87    Ca    7.6<L>      2021 08:39  Phos  2.5     01-22  Mg     2.0     01-22    TPro  6.4  /  Alb  3.0<L>  /  TBili  0.5  /  DBili  x   /  AST  12<L>  /  ALT  16  /  AlkPhos  66      CARDIAC MARKERS ( 2021 23:29 )  <0.015 ng/mL / x     / x     / x     / x          LIVER FUNCTIONS - ( 2021 11:14 )  Alb: 3.0 g/dL / Pro: 6.4 gm/dL / ALK PHOS: 66 U/L / ALT: 16 U/L / AST: 12 U/L / GGT: x                     I&O's Summary    BNP  RADIOLOGY & ADDITIONAL STUDIES:  t< from: LAZ Complete w/Spect and Color (21 @ 08:34) >   EXAM:  LAZ COMP W SPECT AND COLOR#         PROCEDURE DATE:  2021        INTERPRETATION:  Transesophageal Echocardiography Report (LAZ)     Demographics     Patient Name      JIM LOPEZ       Age           70 year(s)     Med Rec #  [id number]              Gender        Female     Account #         [id number]           Date of Birth 1950     Interpreting      Padmini Hinojosa,   Room Number   0333   Physician         MD     Referring         Padmini Verduzco             Sonographer   Tayler Lynn,   Physician         MD Walt                   [facility], [place]     Date of study     2021 08:08 AM     Height            162.6 in               Weight        203.05 pounds     The procedure was explained in detail to the patient. Risks,   complications and alternative treatments were reviewed. Written consent   was obtained.    Type of Study:     LAZ procedure: LAZ COMP W SPECT AND COLOR#     HR: 120 bpmBP: 152/99 mmHg     Study Location: O/PTechnical Quality: Fair     Contrast Medium: Bubble Study.    Indications   1) I48.91 - Unspecified artial fibrillation     Findings     Mitral Valve   Moderate calcification of the posterior mitral valve leaflet.   Moderate mitral insufficiency.     Aortic Valve   The aortic valve is sclerotic.     Tricuspid Valve   Trace tricuspid valve regurgitation is present.     Pulmonic Valve   Pulmonic valve not well seen.     Left Atrium   The left atrium is moderately dilated.     Left Ventricle   The left ventricle is normal in size, wall thickness, wall motion and   contractility.     Right Atrium   Normal appearing right atrium.     Right Ventricle   Normal appearing right ventricle structure and function.     Miscellaneous   No intracardiac masses, thrombi or vegetations were seen.   Left atrial appendage is free of thrombus.   Intact interatrial septum.   Moderate atherosclerosis of the thoracic aorta.     Impression     Summary     Moderate calcification of the posterior mitral valve leaflet.   Moderate mitralinsufficiency.   The aortic valve is sclerotic.   Trace tricuspid valve regurgitation is present.   Pulmonic valve not well seen.   The left atrium is moderately dilated.   The left ventricle is normal in size, wall thickness, wall motion and   contractility.   Normal appearing right atrium.   Normal appearing right ventricle structure and function.   No intracardiac masses, thrombi or vegetations were seen.   Left atrial appendage is free of thrombus.   Intact interatrial septum.   Moderate atherosclerosis of the thoracic aorta.     Signature     ----------------------------------------------------------------   Electronically signed by Padmini Hinojosa MD(Interpreting   physician) on 2021 09:53 AM   ----------------------------------------------------------------    Valves    Structures              PADMINI HINOJOSA MD; Attending Cardiologist  This document has been electronically signed. 2021  9:54AM    < end of copied text >

## 2021-01-22 NOTE — PROGRESS NOTE ADULT - SUBJECTIVE AND OBJECTIVE BOX
CHIEF COMPLAINT/DIAGNOSIS: admitted for LAZ cardioversion for AFIB, now receiving Tikosyn loading     HPI: 71 y/o female with PMHX of PAF on Eliquis, HTN, HLD, and obesity s/p gastric bypass surgery who presented to  for planned LAZ cardioversion for AFIB.  Patient was recently dx with AFIB in JAN 2021; however, patient has been having ongoing palpitations for the last few months.  Patient notes she has a hx of GERD and was having palpitations/ epigastric pain off and on for months.  Patient was also having intermittent SOB with exertion.  Patient ultimately found to have PAF and was started on Eliquis by Dr. Turner and was set up for LAZ/ Cardioversion due to persistent AFIB on rate control agents.  Patient had successful cardioversion this morning but EP was consulted with concern of recurrent AFIB.  Decision was made for admission for starting TIKOSYN.      1/22 -   REVIEW OF SYSTEMS:  All other review of systems is negative unless indicated above    PHYSICAL EXAM:  Constitutional: NAD, awake and alert, well-developed  HEENT: PERR, EOMI, Normal Hearing, MMM  Neck: Soft and supple, No LAD, No JVD  Respiratory: Breath sounds are clear bilaterally, No wheezing, rales or rhonchi  Cardiovascular: S1 and S2, regular rate and rhythm, no Murmurs, gallops or rubs  Gastrointestinal: Bowel Sounds present, soft, nontender, nondistended, no guarding, no rebound  Extremities: No peripheral edema  Vascular: 2+ peripheral pulses  Neurological: A/O x 3, no focal deficits  Musculoskeletal: 5/5 strength b/l upper and lower extremities  Skin: No rashes    Vital Signs Last 24 Hrs  T(C): 36.2 (22 Jan 2021 08:51), Max: 36.7 (21 Jan 2021 16:48)  T(F): 97.2 (22 Jan 2021 08:51), Max: 98.1 (21 Jan 2021 19:47)  HR: 65 (22 Jan 2021 08:51) (56 - 68)  BP: 120/80 (22 Jan 2021 08:51) (107/53 - 128/83)  BP(mean): 66 (22 Jan 2021 04:32) (66 - 66)  RR: 18 (22 Jan 2021 08:51) (16 - 18)  SpO2: 96% (22 Jan 2021 08:51) (94% - 98%)    LABS: All Labs Reviewed:                        11.6   6.68  )-----------( 210      ( 22 Jan 2021 08:39 )             35.7     01-22    136  |  106  |  10  ----------------------------<  146<H>  3.8   |  25  |  0.87    Ca    7.6<L>      22 Jan 2021 08:39  Phos  2.5     01-22  Mg     2.0     01-22    TPro  6.4  /  Alb  3.0<L>  /  TBili  0.5  /  DBili  x   /  AST  12<L>  /  ALT  16  /  AlkPhos  66  01-21    CARDIAC MARKERS ( 21 Jan 2021 23:29 )  <0.015 ng/mL / x     / x     / x     / x        ECHOCARDIOGRAM:  < from: LAZ Complete w/Spect and Color (01.21.21 @ 08:34) >   Moderate calcification of the posterior mitral valve leaflet.   Moderate mitralinsufficiency.   The aortic valve is sclerotic.   Trace tricuspid valve regurgitation is present.   Pulmonic valve not well seen.   The left atrium is moderately dilated.   The left ventricle is normal in size, wall thickness, wall motion and   contractility.   Normal appearing right atrium.   Normal appearing right ventricle structure and function.   No intracardiac masses, thrombi or vegetations were seen.   Left atrial appendage is free of thrombus.   Intact interatrial septum.   Moderate atherosclerosis of the thoracic aorta.  < end of copied text >    MEDICATIONS  (STANDING):  apixaban 5 milliGRAM(s) Oral every 12 hours  budesonide 160 MICROgram(s)/formoterol 4.5 MICROgram(s) Inhaler 2 Puff(s) Inhalation two times a day  dofetilide 250 MICROGram(s) Oral every 12 hours  folic acid 1 milliGRAM(s) Oral daily  latanoprost 0.005% Ophthalmic Solution 1 Drop(s) Both EYES at bedtime  metoprolol succinate  milliGRAM(s) Oral daily  pantoprazole    Tablet 40 milliGRAM(s) Oral before breakfast  valsartan 160 milliGRAM(s) Oral daily    MEDICATIONS  (PRN):  acetaminophen   Tablet .. 650 milliGRAM(s) Oral Once PRN Mild Pain (1 - 3)  aluminum hydroxide/magnesium hydroxide/simethicone Suspension 30 milliLiter(s) Oral Once PRN Dyspepsia  ondansetron Injectable 4 milliGRAM(s) IV Push Once PRN Nausea  zolpidem 5 milliGRAM(s) Oral at bedtime PRN Insomnia    TELEMETRY REVIEW:  1/22 - sinus 50-70s QTC < 500    ASSESSMENT AND PLAN:    71 y/o female with PMHX of PAF on Eliquis, HTN, HLD, and obesity s/p gastric bypass surgery who presented to  for planned LAZ cardioversion for AFIB.  Decision to admit for TIKOSYN loading as patient high risk for recurrent AFIB.      1) PAF    - S/p LAZ, cardioversion today with return to NSR.    - Starting TIKOSYN-- monitoring inpatient x72 hours.  Plan d/c Monday if stable.    - Cont Toprol xl 150 QD    - Keep K4, Mg2  - Cont Eliquis for stroke PPX    - Cardio, EP f/u appreciated     2) GERD - Cont protonix       3) HTN - Cont home meds     4) HLD - Cont statin    5) Obesity s/p Gastric Bypass - Stable.      6) DVT Proph:  Eliquis  CHIEF COMPLAINT/DIAGNOSIS: admitted for LAZ cardioversion for AFIB, now receiving Tikosyn loading     HPI: 71 y/o female with PMHX of PAF on Eliquis, HTN, HLD, and obesity s/p gastric bypass surgery who presented to  for planned LAZ cardioversion for AFIB.  Patient was recently dx with AFIB in JAN 2021; however, patient has been having ongoing palpitations for the last few months.  Patient notes she has a hx of GERD and was having palpitations/ epigastric pain off and on for months.  Patient was also having intermittent SOB with exertion.  Patient ultimately found to have PAF and was started on Eliquis by Dr. Turner and was set up for LAZ/ Cardioversion due to persistent AFIB on rate control agents.  Patient had successful cardioversion this morning but EP was consulted with concern of recurrent AFIB.  Decision was made for admission for starting TIKOSYN.      1/22 - feeling well. had CP last night. no CP since episode. oob ambulating w/o issues. no sob or dizziness.   REVIEW OF SYSTEMS:  All other review of systems is negative unless indicated above    PHYSICAL EXAM:  Constitutional: Awake and alert, well-developed  HEENT: Normal Hearing, MMM  Neck: Soft and supple, No LAD, No JVD  Respiratory: Breath sounds are clear bilaterally, No wheezing, rales or rhonchi  Cardiovascular: S1 and S2, regular rate and rhythm,+ murmur   Gastrointestinal: Bowel Sounds present, soft, nontender, nondistended, no guarding, no rebound  Extremities: No peripheral edema  Vascular: 2+ peripheral pulses  Neurological: A/O x 3, no focal deficits  Musculoskeletal: 5/5 strength b/l upper and lower extremities  Skin: No rashes    Vital Signs Last 24 Hrs  T(C): 36.2 (22 Jan 2021 08:51), Max: 36.7 (21 Jan 2021 16:48)  T(F): 97.2 (22 Jan 2021 08:51), Max: 98.1 (21 Jan 2021 19:47)  HR: 65 (22 Jan 2021 08:51) (56 - 68)  BP: 120/80 (22 Jan 2021 08:51) (107/53 - 128/83)  BP(mean): 66 (22 Jan 2021 04:32) (66 - 66)  RR: 18 (22 Jan 2021 08:51) (16 - 18)  SpO2: 96% (22 Jan 2021 08:51) (94% - 98%) -- room air     LABS: All Labs Reviewed:                        11.6   6.68  )-----------( 210      ( 22 Jan 2021 08:39 )             35.7     01-22    136  |  106  |  10  ----------------------------<  146<H>  3.8   |  25  |  0.87    Ca    7.6<L>      22 Jan 2021 08:39  Phos  2.5     01-22  Mg     2.0     01-22    TPro  6.4  /  Alb  3.0<L>  /  TBili  0.5  /  DBili  x   /  AST  12<L>  /  ALT  16  /  AlkPhos  66  01-21    CARDIAC MARKERS ( 21 Jan 2021 23:29 )  <0.015 ng/mL / x     / x     / x     / x        ECHOCARDIOGRAM:  < from: LAZ Complete w/Spect and Color (01.21.21 @ 08:34) >   Moderate calcification of the posterior mitral valve leaflet.   Moderate mitralinsufficiency.   The aortic valve is sclerotic.   Trace tricuspid valve regurgitation is present.   Pulmonic valve not well seen.   The left atrium is moderately dilated.   The left ventricle is normal in size, wall thickness, wall motion and   contractility.   Normal appearing right atrium.   Normal appearing right ventricle structure and function.   No intracardiac masses, thrombi or vegetations were seen.   Left atrial appendage is free of thrombus.   Intact interatrial septum.   Moderate atherosclerosis of the thoracic aorta.  < end of copied text >    MEDICATIONS  (STANDING):  apixaban 5 milliGRAM(s) Oral every 12 hours  budesonide 160 MICROgram(s)/formoterol 4.5 MICROgram(s) Inhaler 2 Puff(s) Inhalation two times a day  dofetilide 250 MICROGram(s) Oral every 12 hours  folic acid 1 milliGRAM(s) Oral daily  latanoprost 0.005% Ophthalmic Solution 1 Drop(s) Both EYES at bedtime  metoprolol succinate  milliGRAM(s) Oral daily  pantoprazole    Tablet 40 milliGRAM(s) Oral before breakfast  valsartan 160 milliGRAM(s) Oral daily    MEDICATIONS  (PRN):  acetaminophen   Tablet .. 650 milliGRAM(s) Oral Once PRN Mild Pain (1 - 3)  aluminum hydroxide/magnesium hydroxide/simethicone Suspension 30 milliLiter(s) Oral Once PRN Dyspepsia  ondansetron Injectable 4 milliGRAM(s) IV Push Once PRN Nausea  zolpidem 5 milliGRAM(s) Oral at bedtime PRN Insomnia    TELEMETRY REVIEW:  1/22 - sinus 50-70s QTC < 500    ASSESSMENT AND PLAN:    71 y/o female with PMHX of PAF on Eliquis, HTN, HLD, and obesity s/p gastric bypass surgery who presented to  for planned LAZ cardioversion for AFIB.  Decision to admit for TIKOSYN loading as patient high risk for recurrent AFIB.      1) PAF    - S/p LAZ, cardioversion today with return to NSR.    - Starting TIKOSYN-- monitoring inpatient x72 hours.  Plan d/c Monday if stable.    - Cont Toprol xl 150 QD  - reduced.  - Keep K4, Mg2  - Cont Eliquis for stroke PPX    - Cardio, EP f/u appreciated     2) GERD - Cont protonix       3) HTN - Cont home meds, BP optimal     4) HLD - Cont statin    5) Obesity s/p Gastric Bypass - Stable.      6) DVT Proph:  Eliquis     Dispo: monitor on tele. Plan d/c Monday if stable.

## 2021-01-23 LAB
ANION GAP SERPL CALC-SCNC: 5 MMOL/L — SIGNIFICANT CHANGE UP (ref 5–17)
BUN SERPL-MCNC: 9 MG/DL — SIGNIFICANT CHANGE UP (ref 7–23)
CALCIUM SERPL-MCNC: 8.1 MG/DL — LOW (ref 8.5–10.1)
CHLORIDE SERPL-SCNC: 109 MMOL/L — HIGH (ref 96–108)
CO2 SERPL-SCNC: 26 MMOL/L — SIGNIFICANT CHANGE UP (ref 22–31)
CREAT SERPL-MCNC: 0.67 MG/DL — SIGNIFICANT CHANGE UP (ref 0.5–1.3)
GLUCOSE SERPL-MCNC: 80 MG/DL — SIGNIFICANT CHANGE UP (ref 70–99)
MAGNESIUM SERPL-MCNC: 2.2 MG/DL — SIGNIFICANT CHANGE UP (ref 1.6–2.6)
POTASSIUM SERPL-MCNC: 4.3 MMOL/L — SIGNIFICANT CHANGE UP (ref 3.5–5.3)
POTASSIUM SERPL-SCNC: 4.3 MMOL/L — SIGNIFICANT CHANGE UP (ref 3.5–5.3)
SODIUM SERPL-SCNC: 140 MMOL/L — SIGNIFICANT CHANGE UP (ref 135–145)

## 2021-01-23 PROCEDURE — 93010 ELECTROCARDIOGRAM REPORT: CPT | Mod: 76

## 2021-01-23 PROCEDURE — 99232 SBSQ HOSP IP/OBS MODERATE 35: CPT

## 2021-01-23 RX ORDER — ALBUTEROL 90 UG/1
2 AEROSOL, METERED ORAL EVERY 6 HOURS
Refills: 0 | Status: DISCONTINUED | OUTPATIENT
Start: 2021-01-23 | End: 2021-01-25

## 2021-01-23 RX ADMIN — DOFETILIDE 250 MICROGRAM(S): 0.25 CAPSULE ORAL at 05:56

## 2021-01-23 RX ADMIN — APIXABAN 5 MILLIGRAM(S): 2.5 TABLET, FILM COATED ORAL at 09:41

## 2021-01-23 RX ADMIN — Medication 1 MILLIGRAM(S): at 11:13

## 2021-01-23 RX ADMIN — APIXABAN 5 MILLIGRAM(S): 2.5 TABLET, FILM COATED ORAL at 21:24

## 2021-01-23 RX ADMIN — VALSARTAN 160 MILLIGRAM(S): 80 TABLET ORAL at 11:13

## 2021-01-23 RX ADMIN — Medication 150 MILLIGRAM(S): at 09:41

## 2021-01-23 RX ADMIN — BUDESONIDE AND FORMOTEROL FUMARATE DIHYDRATE 2 PUFF(S): 160; 4.5 AEROSOL RESPIRATORY (INHALATION) at 19:48

## 2021-01-23 RX ADMIN — DOFETILIDE 250 MICROGRAM(S): 0.25 CAPSULE ORAL at 19:25

## 2021-01-23 RX ADMIN — LATANOPROST 1 DROP(S): 0.05 SOLUTION/ DROPS OPHTHALMIC; TOPICAL at 21:24

## 2021-01-23 RX ADMIN — ALBUTEROL 2 PUFF(S): 90 AEROSOL, METERED ORAL at 19:46

## 2021-01-23 RX ADMIN — ZOLPIDEM TARTRATE 5 MILLIGRAM(S): 10 TABLET ORAL at 23:07

## 2021-01-23 RX ADMIN — PANTOPRAZOLE SODIUM 40 MILLIGRAM(S): 20 TABLET, DELAYED RELEASE ORAL at 05:56

## 2021-01-23 NOTE — PROGRESS NOTE ADULT - SUBJECTIVE AND OBJECTIVE BOX
CHIEF COMPLAINT/DIAGNOSIS: admitted for LAZ cardioversion for AFIB, now receiving Tikosyn loading     HPI: 71 y/o female with PMHX of PAF on Eliquis, HTN, HLD, and obesity s/p gastric bypass surgery who presented to  for planned LAZ cardioversion for AFIB.  Patient was recently dx with AFIB in JAN 2021; however, patient has been having ongoing palpitations for the last few months.  Patient notes she has a hx of GERD and was having palpitations/ epigastric pain off and on for months.  Patient was also having intermittent SOB with exertion.  Patient ultimately found to have PAF and was started on Eliquis by Dr. Turner and was set up for LAZ/ Cardioversion due to persistent AFIB on rate control agents.  Patient had successful cardioversion this morning but EP was consulted with concern of recurrent AFIB.  Decision was made for admission for starting TIKOSYN.      1/22 - feeling well. had CP last night. no CP since episode. oob ambulating w/o issues. no sob or dizziness.   1/23 -   REVIEW OF SYSTEMS:  All other review of systems is negative unless indicated above    PHYSICAL EXAM:  Constitutional: Awake and alert, well-developed  HEENT: Normal Hearing, MMM  Neck: Soft and supple, No LAD, No JVD  Respiratory: Breath sounds are clear bilaterally, No wheezing, rales or rhonchi  Cardiovascular: S1 and S2, regular rate and rhythm,+ murmur   Gastrointestinal: Bowel Sounds present, soft, nontender, nondistended, no guarding, no rebound  Extremities: No peripheral edema  Vascular: 2+ peripheral pulses  Neurological: A/O x 3, no focal deficits  Musculoskeletal: 5/5 strength b/l upper and lower extremities  Skin: No rashes    Vital Signs Last 24 Hrs  T(C): 36.6 (23 Jan 2021 06:20), Max: 36.6 (22 Jan 2021 19:30)  T(F): 97.8 (23 Jan 2021 06:20), Max: 97.9 (22 Jan 2021 19:30)  HR: 60 (23 Jan 2021 06:20) (60 - 66)  BP: 134/77 (23 Jan 2021 06:20) (116/58 - 134/77)  BP(mean): --  RR: 18 (22 Jan 2021 19:30) (18 - 18)  SpO2: 94% (23 Jan 2021 06:20) (94% - 96%)    LABS: All Labs Reviewed:                        11.6   6.68  )-----------( 210      ( 22 Jan 2021 08:39 )             35.7     01-22    136  |  106  |  10  ----------------------------<  146<H>  3.8   |  25  |  0.87    Ca    7.6<L>      22 Jan 2021 08:39  Phos  2.5     01-22  Mg     2.0     01-22    TPro  6.4  /  Alb  3.0<L>  /  TBili  0.5  /  DBili  x   /  AST  12<L>  /  ALT  16  /  AlkPhos  66  01-21    CARDIAC MARKERS ( 21 Jan 2021 23:29 )  <0.015 ng/mL / x     / x     / x     / x        ECHOCARDIOGRAM:  < from: LAZ Complete w/Spect and Color (01.21.21 @ 08:34) >   Moderate calcification of the posterior mitral valve leaflet.   Moderate mitralinsufficiency.   The aortic valve is sclerotic.   Trace tricuspid valve regurgitation is present.   Pulmonic valve not well seen.   The left atrium is moderately dilated.   The left ventricle is normal in size, wall thickness, wall motion and   contractility.   Normal appearing right atrium.   Normal appearing right ventricle structure and function.   No intracardiac masses, thrombi or vegetations were seen.   Left atrial appendage is free of thrombus.   Intact interatrial septum.   Moderate atherosclerosis of the thoracic aorta.  < end of copied text >    MEDICATIONS  (STANDING):  apixaban 5 milliGRAM(s) Oral every 12 hours  budesonide 160 MICROgram(s)/formoterol 4.5 MICROgram(s) Inhaler 2 Puff(s) Inhalation two times a day  dofetilide 250 MICROGram(s) Oral every 12 hours  folic acid 1 milliGRAM(s) Oral daily  latanoprost 0.005% Ophthalmic Solution 1 Drop(s) Both EYES at bedtime  metoprolol succinate  milliGRAM(s) Oral daily  pantoprazole    Tablet 40 milliGRAM(s) Oral before breakfast  valsartan 160 milliGRAM(s) Oral daily    MEDICATIONS  (PRN):  acetaminophen   Tablet .. 650 milliGRAM(s) Oral Once PRN Mild Pain (1 - 3)  aluminum hydroxide/magnesium hydroxide/simethicone Suspension 30 milliLiter(s) Oral Once PRN Dyspepsia  ondansetron Injectable 4 milliGRAM(s) IV Push Once PRN Nausea  zolpidem 5 milliGRAM(s) Oral at bedtime PRN Insomnia    TELEMETRY REVIEW:  1/22 - sinus 50-70s QTC < 500  1/23 - sinus, QTC < 500    ASSESSMENT AND PLAN:    71 y/o female with PMHX of PAF on Eliquis, HTN, HLD, and obesity s/p gastric bypass surgery who presented to  for planned LAZ cardioversion for AFIB.  Decision to admit for TIKOSYN loading as patient high risk for recurrent AFIB.      1) Paroxysmal AFib     - S/p LAZ, cardioversion today with return to NSR.    - Starting TIKOSYN-- monitoring inpatient x72 hours.  Plan d/c Monday if stable.    - Cont Toprol xl 150 QD  - reduced.  - Keep K4, Mg2  - Cont Eliquis for stroke PPX    - Cardio, EP f/u appreciated     2) GERD - Cont Protonix       3) HTN - Cont home meds, BP optimal     4) HLD - Cont statin    5) Obesity s/p Gastric Bypass - Stable.      6) DVT PPX -  Eliquis     Dispo: monitor on tele. Plan d/c Monday if stable.   CHIEF COMPLAINT/DIAGNOSIS: admitted for LAZ cardioversion for AFIB, now receiving Tikosyn loading     HPI: 69 y/o female with PMHX of PAF on Eliquis, HTN, HLD, and obesity s/p gastric bypass surgery who presented to  for planned LAZ cardioversion for AFIB.  Patient was recently dx with AFIB in JAN 2021; however, patient has been having ongoing palpitations for the last few months.  Patient notes she has a hx of GERD and was having palpitations/ epigastric pain off and on for months.  Patient was also having intermittent SOB with exertion.  Patient ultimately found to have PAF and was started on Eliquis by Dr. Turner and was set up for LAZ/ Cardioversion due to persistent AFIB on rate control agents.  Patient had successful cardioversion this morning but EP was consulted with concern of recurrent AFIB.  Decision was made for admission for starting TIKOSYN.      1/22 - feeling well. had CP last night. no CP since episode. oob ambulating w/o issues. no sob or dizziness.   1/23 - had some wheezing this Am - will order inhalers (on symbicort/ albuterol at home) no other complaints. feeling well.  REVIEW OF SYSTEMS:  All other review of systems is negative unless indicated above    PHYSICAL EXAM:  Constitutional: Awake and alert, well-developed  HEENT: Normal Hearing, MMM  Neck: Soft and supple, No LAD, No JVD  Respiratory: Breath sounds are clear bilaterally, No wheezing, rales or rhonchi  Cardiovascular: S1 and S2, regular rate and rhythm,+ murmur   Gastrointestinal: Bowel Sounds present, soft, nontender, nondistended, no guarding, no rebound  Extremities: No peripheral edema  Vascular: 2+ peripheral pulses  Neurological: A/O x 3, no focal deficits  Musculoskeletal: 5/5 strength b/l upper and lower extremities  Skin: No rashes    Vital Signs Last 24 Hrs  T(C): 36.6 (23 Jan 2021 06:20), Max: 36.6 (22 Jan 2021 19:30)  T(F): 97.8 (23 Jan 2021 06:20), Max: 97.9 (22 Jan 2021 19:30)  HR: 60 (23 Jan 2021 06:20) (60 - 66)  BP: 134/77 (23 Jan 2021 06:20) (116/58 - 134/77)  BP(mean): --  RR: 18 (22 Jan 2021 19:30) (18 - 18)  SpO2: 94% (23 Jan 2021 06:20) (94% - 96%)    LABS: All Labs Reviewed:                        11.6   6.68  )-----------( 210      ( 22 Jan 2021 08:39 )             35.7     01-22    136  |  106  |  10  ----------------------------<  146<H>  3.8   |  25  |  0.87    Ca    7.6<L>      22 Jan 2021 08:39  Phos  2.5     01-22  Mg     2.0     01-22    TPro  6.4  /  Alb  3.0<L>  /  TBili  0.5  /  DBili  x   /  AST  12<L>  /  ALT  16  /  AlkPhos  66  01-21    CARDIAC MARKERS ( 21 Jan 2021 23:29 )  <0.015 ng/mL / x     / x     / x     / x        ECHOCARDIOGRAM:  < from: LAZ Complete w/Spect and Color (01.21.21 @ 08:34) >   Moderate calcification of the posterior mitral valve leaflet.   Moderate mitralinsufficiency.   The aortic valve is sclerotic.   Trace tricuspid valve regurgitation is present.   Pulmonic valve not well seen.   The left atrium is moderately dilated.   The left ventricle is normal in size, wall thickness, wall motion and   contractility.   Normal appearing right atrium.   Normal appearing right ventricle structure and function.   No intracardiac masses, thrombi or vegetations were seen.   Left atrial appendage is free of thrombus.   Intact interatrial septum.   Moderate atherosclerosis of the thoracic aorta.  < end of copied text >    MEDICATIONS  (STANDING):  apixaban 5 milliGRAM(s) Oral every 12 hours  budesonide 160 MICROgram(s)/formoterol 4.5 MICROgram(s) Inhaler 2 Puff(s) Inhalation two times a day  dofetilide 250 MICROGram(s) Oral every 12 hours  folic acid 1 milliGRAM(s) Oral daily  latanoprost 0.005% Ophthalmic Solution 1 Drop(s) Both EYES at bedtime  metoprolol succinate  milliGRAM(s) Oral daily  pantoprazole    Tablet 40 milliGRAM(s) Oral before breakfast  valsartan 160 milliGRAM(s) Oral daily    MEDICATIONS  (PRN):  acetaminophen   Tablet .. 650 milliGRAM(s) Oral Once PRN Mild Pain (1 - 3)  aluminum hydroxide/magnesium hydroxide/simethicone Suspension 30 milliLiter(s) Oral Once PRN Dyspepsia  ondansetron Injectable 4 milliGRAM(s) IV Push Once PRN Nausea  zolpidem 5 milliGRAM(s) Oral at bedtime PRN Insomnia    TELEMETRY REVIEW:  1/22 - sinus 50-70s QTC < 500  1/23 - sinus, QTC < 500 (492 on tele monitor)     ASSESSMENT AND PLAN:    69 y/o female with PMHX of PAF on Eliquis, HTN, HLD, and obesity s/p gastric bypass surgery who presented to  for planned LAZ cardioversion for AFIB.  Decision to admit for TIKOSYN loading as patient high risk for recurrent AFIB.      1) Paroxysmal AFib     - S/p LAZ, cardioversion today with return to NSR.    - Starting TIKOSYN-- monitoring inpatient x72 hours.  Plan d/c Monday if stable.    - Cont Toprol xl 150 QD  - reduced.  - Keep K4, Mg2  - Cont Eliquis for stroke PPX    - Cardio, EP f/u appreciated     2) GERD - Cont Protonix       3) HTN - Cont home meds, BP optimal     4) HLD - Cont statin    5) Obesity s/p Gastric Bypass - Stable.      6) DVT PPX -  Eliquis     Dispo: monitor on tele. Plan d/c Monday if stable.

## 2021-01-23 NOTE — PROGRESS NOTE ADULT - SUBJECTIVE AND OBJECTIVE BOX
Patient is a 70y old  Female who presents with a chief complaint of AFIB cardioversion (22 Jan 2021 13:01)    1/23- feels well offers no complaints , EKG today shows QTc of 475    MEDICATIONS  (STANDING):  apixaban 5 milliGRAM(s) Oral every 12 hours  budesonide 160 MICROgram(s)/formoterol 4.5 MICROgram(s) Inhaler 2 Puff(s) Inhalation two times a day  dofetilide 250 MICROGram(s) Oral every 12 hours  folic acid 1 milliGRAM(s) Oral daily  latanoprost 0.005% Ophthalmic Solution 1 Drop(s) Both EYES at bedtime  metoprolol succinate  milliGRAM(s) Oral daily  pantoprazole    Tablet 40 milliGRAM(s) Oral before breakfast  valsartan 160 milliGRAM(s) Oral daily    MEDICATIONS  (PRN):  acetaminophen   Tablet .. 650 milliGRAM(s) Oral Once PRN Mild Pain (1 - 3)  aluminum hydroxide/magnesium hydroxide/simethicone Suspension 30 milliLiter(s) Oral Once PRN Dyspepsia  ondansetron Injectable 4 milliGRAM(s) IV Push Once PRN Nausea  zolpidem 5 milliGRAM(s) Oral at bedtime PRN Insomnia            Vital Signs Last 24 Hrs  T(C): 36.6 (23 Jan 2021 06:20), Max: 36.6 (22 Jan 2021 19:30)  T(F): 97.8 (23 Jan 2021 06:20), Max: 97.9 (22 Jan 2021 19:30)  HR: 60 (23 Jan 2021 06:20) (60 - 66)  BP: 134/77 (23 Jan 2021 06:20) (116/58 - 134/77)  BP(mean): --  RR: 18 (22 Jan 2021 19:30) (18 - 18)  SpO2: 94% (23 Jan 2021 06:20) (94% - 96%)            INTERPRETATION OF TELEMETRY: SR    ECG:        LABS:                        11.6   6.68  )-----------( 210      ( 22 Jan 2021 08:39 )             35.7     01-22    136  |  106  |  10  ----------------------------<  146<H>  3.8   |  25  |  0.87    Ca    7.6<L>      22 Jan 2021 08:39  Phos  2.5     01-22  Mg     2.0     01-22    TPro  6.4  /  Alb  3.0<L>  /  TBili  0.5  /  DBili  x   /  AST  12<L>  /  ALT  16  /  AlkPhos  66  01-21    CARDIAC MARKERS ( 21 Jan 2021 23:29 )  <0.015 ng/mL / x     / x     / x     / x              I&O's Summary    BNP  RADIOLOGY & ADDITIONAL STUDIES:

## 2021-01-24 ENCOUNTER — TRANSCRIPTION ENCOUNTER (OUTPATIENT)
Age: 71
End: 2021-01-24

## 2021-01-24 VITALS
TEMPERATURE: 98 F | RESPIRATION RATE: 18 BRPM | HEART RATE: 71 BPM | OXYGEN SATURATION: 96 % | DIASTOLIC BLOOD PRESSURE: 77 MMHG | SYSTOLIC BLOOD PRESSURE: 113 MMHG

## 2021-01-24 LAB
ANION GAP SERPL CALC-SCNC: 5 MMOL/L — SIGNIFICANT CHANGE UP (ref 5–17)
BUN SERPL-MCNC: 11 MG/DL — SIGNIFICANT CHANGE UP (ref 7–23)
CALCIUM SERPL-MCNC: 7.8 MG/DL — LOW (ref 8.5–10.1)
CHLORIDE SERPL-SCNC: 110 MMOL/L — HIGH (ref 96–108)
CO2 SERPL-SCNC: 25 MMOL/L — SIGNIFICANT CHANGE UP (ref 22–31)
CREAT SERPL-MCNC: 0.65 MG/DL — SIGNIFICANT CHANGE UP (ref 0.5–1.3)
GLUCOSE SERPL-MCNC: 82 MG/DL — SIGNIFICANT CHANGE UP (ref 70–99)
MAGNESIUM SERPL-MCNC: 2.2 MG/DL — SIGNIFICANT CHANGE UP (ref 1.6–2.6)
POTASSIUM SERPL-MCNC: 4.1 MMOL/L — SIGNIFICANT CHANGE UP (ref 3.5–5.3)
POTASSIUM SERPL-SCNC: 4.1 MMOL/L — SIGNIFICANT CHANGE UP (ref 3.5–5.3)
SODIUM SERPL-SCNC: 140 MMOL/L — SIGNIFICANT CHANGE UP (ref 135–145)

## 2021-01-24 PROCEDURE — 99232 SBSQ HOSP IP/OBS MODERATE 35: CPT

## 2021-01-24 PROCEDURE — 93010 ELECTROCARDIOGRAM REPORT: CPT

## 2021-01-24 RX ORDER — MINERAL OIL
133 OIL (ML) MISCELLANEOUS ONCE
Refills: 0 | Status: DISCONTINUED | OUTPATIENT
Start: 2021-01-24 | End: 2021-01-25

## 2021-01-24 RX ORDER — ALBUTEROL 90 UG/1
0 AEROSOL, METERED ORAL
Qty: 0 | Refills: 0 | DISCHARGE

## 2021-01-24 RX ORDER — PREGABALIN 225 MG/1
0 CAPSULE ORAL
Qty: 0 | Refills: 0 | DISCHARGE

## 2021-01-24 RX ORDER — FOLIC ACID 0.8 MG
0 TABLET ORAL
Qty: 0 | Refills: 0 | DISCHARGE

## 2021-01-24 RX ORDER — ZINC SULFATE TAB 220 MG (50 MG ZINC EQUIVALENT) 220 (50 ZN) MG
0 TAB ORAL
Qty: 0 | Refills: 0 | DISCHARGE

## 2021-01-24 RX ORDER — METOPROLOL TARTRATE 50 MG
150 TABLET ORAL
Qty: 0 | Refills: 0 | DISCHARGE

## 2021-01-24 RX ORDER — DOFETILIDE 0.25 MG/1
1 CAPSULE ORAL
Qty: 60 | Refills: 0
Start: 2021-01-24 | End: 2021-02-22

## 2021-01-24 RX ORDER — LOPERAMIDE HCL 2 MG
2 TABLET ORAL ONCE
Refills: 0 | Status: COMPLETED | OUTPATIENT
Start: 2021-01-24 | End: 2021-01-24

## 2021-01-24 RX ADMIN — ALBUTEROL 2 PUFF(S): 90 AEROSOL, METERED ORAL at 14:45

## 2021-01-24 RX ADMIN — DOFETILIDE 250 MICROGRAM(S): 0.25 CAPSULE ORAL at 07:08

## 2021-01-24 RX ADMIN — PANTOPRAZOLE SODIUM 40 MILLIGRAM(S): 20 TABLET, DELAYED RELEASE ORAL at 06:17

## 2021-01-24 RX ADMIN — BUDESONIDE AND FORMOTEROL FUMARATE DIHYDRATE 2 PUFF(S): 160; 4.5 AEROSOL RESPIRATORY (INHALATION) at 08:58

## 2021-01-24 RX ADMIN — ZOLPIDEM TARTRATE 5 MILLIGRAM(S): 10 TABLET ORAL at 21:03

## 2021-01-24 RX ADMIN — VALSARTAN 160 MILLIGRAM(S): 80 TABLET ORAL at 09:15

## 2021-01-24 RX ADMIN — ALBUTEROL 2 PUFF(S): 90 AEROSOL, METERED ORAL at 08:58

## 2021-01-24 RX ADMIN — Medication 2 MILLIGRAM(S): at 23:56

## 2021-01-24 RX ADMIN — DOFETILIDE 250 MICROGRAM(S): 0.25 CAPSULE ORAL at 18:21

## 2021-01-24 RX ADMIN — BUDESONIDE AND FORMOTEROL FUMARATE DIHYDRATE 2 PUFF(S): 160; 4.5 AEROSOL RESPIRATORY (INHALATION) at 20:32

## 2021-01-24 RX ADMIN — LATANOPROST 1 DROP(S): 0.05 SOLUTION/ DROPS OPHTHALMIC; TOPICAL at 21:02

## 2021-01-24 RX ADMIN — Medication 1 MILLIGRAM(S): at 09:15

## 2021-01-24 RX ADMIN — APIXABAN 5 MILLIGRAM(S): 2.5 TABLET, FILM COATED ORAL at 09:15

## 2021-01-24 RX ADMIN — ALBUTEROL 2 PUFF(S): 90 AEROSOL, METERED ORAL at 20:32

## 2021-01-24 RX ADMIN — APIXABAN 5 MILLIGRAM(S): 2.5 TABLET, FILM COATED ORAL at 21:02

## 2021-01-24 RX ADMIN — Medication 150 MILLIGRAM(S): at 09:15

## 2021-01-24 NOTE — DISCHARGE NOTE PROVIDER - NSDCCPCAREPLAN_GEN_ALL_CORE_FT
01-May-2019 PRINCIPAL DISCHARGE DIAGNOSIS  Diagnosis: Atrial fibrillation  Assessment and Plan of Treatment: WHAT IS ATRIAL FIBRILLATION?  - You were admitted due to Atrial fibrillation (AFIB) which is a cardiac arrhythmia. An arrhythmia is a problem with the speed or rhythm of the heartbeat. The cause is a disorder in the heart's electrical system.  You underwent a cardioversion and now your heart is in a normal rate and rhythm   - You recieved Tikosyin loading while you were in the hospital.   THINGS TO DO:  - To prevent atrial fibrillation we recommend you continue to follow a heart healthy diet and maintain a healthy weight, do not smoke -- nicotine can cause heart damage, limit alcohol intake and manage other health conditions -- this includes high blood pressure or cholesterol, sleep apnea, diabetes, and other heart conditions.   - Monitor you blood pressure 1 to 2 times a days and keep a log for your cardiologist.   MEDICATIONS:   - Continue Metoprolol 150mg once a day (dose reduced from home dose)  - Continue Tikosyin 250mcg twice a day (new medication)  - Continue to take Eliquis 5mg twice a day for stroke prevention  FOLLOW UP APPOINTMENTS:  - Follow up with your cardiologist Suzie in 1 to 2 weeks after discharge for further management - Call to make an appointment.   - Follow up with your cardiac electrophysiologist - Dr. Elise in 2-3 weeks for further management - Call to make an appointment.   **Monitor for the following signs/symptoms: palpitations, chest pain or shortness of breath. If you experience any of these signs/symptoms please alert your primary care provider or return to the ED if your symptoms are severe**       PRINCIPAL DISCHARGE DIAGNOSIS  Diagnosis: Atrial fibrillation  Assessment and Plan of Treatment: WHAT IS ATRIAL FIBRILLATION?  - You were admitted due to Atrial fibrillation (AFIB) which is a cardiac arrhythmia. An arrhythmia is a problem with the speed or rhythm of the heartbeat. The cause is a disorder in the heart's electrical system.  You underwent a cardioversion and now your heart is in a normal rate and rhythm   - You recieved Tikosyin loading while you were in the hospital.   THINGS TO DO:  - To prevent atrial fibrillation we recommend you continue to follow a heart healthy diet and maintain a healthy weight, do not smoke -- nicotine can cause heart damage, limit alcohol intake and manage other health conditions -- this includes high blood pressure or cholesterol, sleep apnea, diabetes, and other heart conditions.   - Monitor you blood pressure 1 to 2 times a days and keep a log for your cardiologist.   MEDICATIONS:   - Continue Metoprolol 150mg once a day (dose reduced from home dose)  - Continue Tikosyin 250mcg twice a day (new medication, sent to pharmacy)  - Continue to take Eliquis 5mg twice a day for stroke prevention  - Continue Magnesium supplement 200mg once a day (sent to pharmacy)  FOLLOW UP APPOINTMENTS:  - Follow up with your cardiologist Suzie in 1 to 2 weeks after discharge for further management - Call to make an appointment.   - Follow up with your cardiac electrophysiologist - Dr. Elise in 2-3 weeks for further management - Call to make an appointment.   **Monitor for the following signs/symptoms: palpitations, chest pain or shortness of breath. If you experience any of these signs/symptoms please alert your primary care provider or return to the ED if your symptoms are severe**

## 2021-01-24 NOTE — DISCHARGE NOTE PROVIDER - PROVIDER TOKENS
PROVIDER:[TOKEN:[[id number]]],PROVIDER:[TOKEN:[[id number]]] PROVIDER:[TOKEN:[[id number]]],PROVIDER:[TOKEN:[[id number]]]

## 2021-01-24 NOTE — PROGRESS NOTE ADULT - SUBJECTIVE AND OBJECTIVE BOX
CHIEF COMPLAINT/DIAGNOSIS: admitted for LAZ cardioversion for AFIB, now receiving Tikosyn loading     HPI: 71 y/o female with PMHX of PAF on Eliquis, HTN, HLD, and obesity s/p gastric bypass surgery who presented to  for planned LAZ cardioversion for AFIB.  Patient was recently dx with AFIB in JAN 2021; however, patient has been having ongoing palpitations for the last few months.  Patient notes she has a hx of GERD and was having palpitations/ epigastric pain off and on for months.  Patient was also having intermittent SOB with exertion.  Patient ultimately found to have PAF and was started on Eliquis by Dr. Turner and was set up for LAZ/ Cardioversion due to persistent AFIB on rate control agents.  Patient had successful cardioversion this morning but EP was consulted with concern of recurrent AFIB.  Decision was made for admission for starting TIKOSYN.      1/22 - feeling well. had CP last night. no CP since episode. oob ambulating w/o issues. no sob or dizziness.   1/23 - had some wheezing this Am - will order inhalers (on symbicort/ albuterol at home) no other complaints. feeling well.  1/24 -     REVIEW OF SYSTEMS:  All other review of systems is negative unless indicated above    PHYSICAL EXAM:  Constitutional: Awake and alert, well-developed  HEENT: Normal Hearing, MMM  Neck: Soft and supple, No LAD, No JVD  Respiratory: Breath sounds are clear bilaterally, No wheezing, rales or rhonchi  Cardiovascular: S1 and S2, regular rate and rhythm,+ murmur   Gastrointestinal: Bowel Sounds present, soft, nontender, nondistended, no guarding, no rebound  Extremities: No peripheral edema  Vascular: 2+ peripheral pulses  Neurological: A/O x 3, no focal deficits  Musculoskeletal: 5/5 strength b/l upper and lower extremities  Skin: No rashes    Vital Signs Last 24 Hrs  T(C): 36.9 (24 Jan 2021 09:37), Max: 36.9 (24 Jan 2021 09:37)  T(F): 98.5 (24 Jan 2021 09:37), Max: 98.5 (24 Jan 2021 09:37)  HR: 71 (24 Jan 2021 09:37) (66 - 80)  BP: 113/77 (24 Jan 2021 09:37) (113/77 - 131/67)  BP(mean): --  RR: 18 (24 Jan 2021 09:37) (18 - 20)  SpO2: 96% (24 Jan 2021 09:37) (94% - 96%)    LABS: All Labs Reviewed:    01-24    140  |  110<H>  |  11  ----------------------------<  82  4.1   |  25  |  0.65    Ca    7.8<L>      24 Jan 2021 07:20  Mg     2.2     01-24    ECHOCARDIOGRAM:  < from: LAZ Complete w/Spect and Color (01.21.21 @ 08:34) >   Moderate calcification of the posterior mitral valve leaflet.   Moderate mitralinsufficiency.   The aortic valve is sclerotic.   Trace tricuspid valve regurgitation is present.   Pulmonic valve not well seen.   The left atrium is moderately dilated.   The left ventricle is normal in size, wall thickness, wall motion and   contractility.   Normal appearing right atrium.   Normal appearing right ventricle structure and function.   No intracardiac masses, thrombi or vegetations were seen.   Left atrial appendage is free of thrombus.   Intact interatrial septum.   Moderate atherosclerosis of the thoracic aorta.  < end of copied text >    MEDICATIONS  (STANDING):  apixaban 5 milliGRAM(s) Oral every 12 hours  budesonide 160 MICROgram(s)/formoterol 4.5 MICROgram(s) Inhaler 2 Puff(s) Inhalation two times a day  dofetilide 250 MICROGram(s) Oral every 12 hours  folic acid 1 milliGRAM(s) Oral daily  latanoprost 0.005% Ophthalmic Solution 1 Drop(s) Both EYES at bedtime  metoprolol succinate  milliGRAM(s) Oral daily  pantoprazole    Tablet 40 milliGRAM(s) Oral before breakfast  valsartan 160 milliGRAM(s) Oral daily    MEDICATIONS  (PRN):  acetaminophen   Tablet .. 650 milliGRAM(s) Oral Once PRN Mild Pain (1 - 3)  aluminum hydroxide/magnesium hydroxide/simethicone Suspension 30 milliLiter(s) Oral Once PRN Dyspepsia  ondansetron Injectable 4 milliGRAM(s) IV Push Once PRN Nausea  zolpidem 5 milliGRAM(s) Oral at bedtime PRN Insomnia    TELEMETRY REVIEW:  1/22 - sinus 50-70s QTC < 500  1/23 - sinus, QTC < 500 (492 on tele monitor)   1/24: , sinus     ASSESSMENT AND PLAN:    71 y/o female with PMHX of PAF on Eliquis, HTN, HLD, and obesity s/p gastric bypass surgery who presented to  for planned LAZ cardioversion for AFIB.  Decision to admit for TIKOSYN loading as patient high risk for recurrent AFIB.      1) Paroxysmal AFib     - S/p LAZ, cardioversion today with return to NSR.    - Starting TIKOSYN-- monitoring inpatient x72 hours.  Plan d/c Monday if stable.    - Cont Toprol xl 150 QD  - reduced.  - Keep K4, Mg2  - Cont Eliquis for stroke PPX    - Cardio, EP f/u appreciated     2) GERD - Cont Protonix       3) HTN - Cont home meds, BP optimal     4) HLD - Cont statin    5) Obesity s/p Gastric Bypass - Stable.      6) DVT PPX -  Eliquis     Dispo: monitor on tele. Plan d/c Monday if stable.   CHIEF COMPLAINT/DIAGNOSIS: admitted for LAZ cardioversion for AFIB, now receiving Tikosyn loading     HPI: 71 y/o female with PMHX of PAF on Eliquis, HTN, HLD, and obesity s/p gastric bypass surgery who presented to  for planned LAZ cardioversion for AFIB.  Patient was recently dx with AFIB in JAN 2021; however, patient has been having ongoing palpitations for the last few months.  Patient notes she has a hx of GERD and was having palpitations/ epigastric pain off and on for months.  Patient was also having intermittent SOB with exertion.  Patient ultimately found to have PAF and was started on Eliquis by Dr. Turner and was set up for LAZ/ Cardioversion due to persistent AFIB on rate control agents.  Patient had successful cardioversion this morning but EP was consulted with concern of recurrent AFIB.  Decision was made for admission for starting TIKOSYN.      1/22 - feeling well. had CP last night. no CP since episode. oob ambulating w/o issues. no sob or dizziness.   1/23 - had some wheezing this Am - will order inhalers (on symbicort/ albuterol at home) no other complaints. feeling well.  1/24 - no complaints. feeling well. eager to be discharge. denies cp, palp or sob.    REVIEW OF SYSTEMS:  All other review of systems is negative unless indicated above    PHYSICAL EXAM:  Constitutional: Awake and alert, well-developed  HEENT: Normal Hearing, MMM  Neck: Soft and supple, No LAD, No JVD  Respiratory: Breath sounds are clear bilaterally, No wheezing, rales or rhonchi  Cardiovascular: S1 and S2, regular rate and rhythm,+ murmur   Gastrointestinal: Bowel Sounds present, soft, nontender, nondistended, no guarding, no rebound  Extremities: No peripheral edema  Vascular: 2+ peripheral pulses  Neurological: A/O x 3, no focal deficits  Musculoskeletal: 5/5 strength b/l upper and lower extremities  Skin: No rashes    Vital Signs Last 24 Hrs  T(C): 36.9 (24 Jan 2021 09:37), Max: 36.9 (24 Jan 2021 09:37)  T(F): 98.5 (24 Jan 2021 09:37), Max: 98.5 (24 Jan 2021 09:37)  HR: 71 (24 Jan 2021 09:37) (66 - 80)  BP: 113/77 (24 Jan 2021 09:37) (113/77 - 131/67)  BP(mean): --  RR: 18 (24 Jan 2021 09:37) (18 - 20)  SpO2: 96% (24 Jan 2021 09:37) (94% - 96%) -- room air     LABS: All Labs Reviewed:    01-24    140  |  110<H>  |  11  ----------------------------<  82  4.1   |  25  |  0.65    Ca    7.8<L>      24 Jan 2021 07:20  Mg     2.2     01-24    ECHOCARDIOGRAM:  < from: LAZ Complete w/Spect and Color (01.21.21 @ 08:34) >   Moderate calcification of the posterior mitral valve leaflet.   Moderate mitralinsufficiency.   The aortic valve is sclerotic.   Trace tricuspid valve regurgitation is present.   Pulmonic valve not well seen.   The left atrium is moderately dilated.   The left ventricle is normal in size, wall thickness, wall motion and   contractility.   Normal appearing right atrium.   Normal appearing right ventricle structure and function.   No intracardiac masses, thrombi or vegetations were seen.   Left atrial appendage is free of thrombus.   Intact interatrial septum.   Moderate atherosclerosis of the thoracic aorta.  < end of copied text >    MEDICATIONS  (STANDING):  apixaban 5 milliGRAM(s) Oral every 12 hours  budesonide 160 MICROgram(s)/formoterol 4.5 MICROgram(s) Inhaler 2 Puff(s) Inhalation two times a day  dofetilide 250 MICROGram(s) Oral every 12 hours  folic acid 1 milliGRAM(s) Oral daily  latanoprost 0.005% Ophthalmic Solution 1 Drop(s) Both EYES at bedtime  metoprolol succinate  milliGRAM(s) Oral daily  pantoprazole    Tablet 40 milliGRAM(s) Oral before breakfast  valsartan 160 milliGRAM(s) Oral daily    MEDICATIONS  (PRN):  acetaminophen   Tablet .. 650 milliGRAM(s) Oral Once PRN Mild Pain (1 - 3)  aluminum hydroxide/magnesium hydroxide/simethicone Suspension 30 milliLiter(s) Oral Once PRN Dyspepsia  ondansetron Injectable 4 milliGRAM(s) IV Push Once PRN Nausea  zolpidem 5 milliGRAM(s) Oral at bedtime PRN Insomnia    TELEMETRY REVIEW:  1/22 - sinus 50-70s QTC < 500  1/23 - sinus, QTC < 500 (492 on tele monitor)   1/24: , sinus     ASSESSMENT AND PLAN:    71 y/o female with PMHX of PAF on Eliquis, HTN, HLD, and obesity s/p gastric bypass surgery who presented to  for planned LAZ cardioversion for AFIB.  Decision to admit for TIKOSYN loading as patient high risk for recurrent AFIB.      1) Paroxysmal AFib     - S/p LAZ, cardioversion today with return to NSR.    - Starting TIKOSYN-- monitoring inpatient x72 hours.  Plan d/c Monday if stable.    - Cont Toprol xl 150 QD  - reduced.  - Keep K4, Mg2  - Cont Eliquis for stroke PPX    - Cardio, EP f/u appreciated     2) GERD - Cont Protonix       3) HTN - Cont home meds, BP optimal     4) HLD - Cont statin    5) Obesity s/p Gastric Bypass - Stable.      6) DVT PPX -  Eliquis     Dispo: monitor on tele. Plan d/c Monday if stable.   CHIEF COMPLAINT/DIAGNOSIS: admitted for LAZ cardioversion for AFIB, now receiving Tikosyn loading     HPI: 69 y/o female with PMHX of PAF on Eliquis, HTN, HLD, and obesity s/p gastric bypass surgery who presented to  for planned LAZ cardioversion for AFIB.  Patient was recently dx with AFIB in JAN 2021; however, patient has been having ongoing palpitations for the last few months.  Patient notes she has a hx of GERD and was having palpitations/ epigastric pain off and on for months.  Patient was also having intermittent SOB with exertion.  Patient ultimately found to have PAF and was started on Eliquis by Dr. Turner and was set up for LAZ/ Cardioversion due to persistent AFIB on rate control agents.  Patient had successful cardioversion this morning but EP was consulted with concern of recurrent AFIB.  Decision was made for admission for starting TIKOSYN.      1/22 - feeling well. had CP last night. no CP since episode. oob ambulating w/o issues. no sob or dizziness.   1/23 - had some wheezing this Am - will order inhalers (on symbicort/ albuterol at home) no other complaints. feeling well.  1/24 - no complaints. feeling well. eager to be discharge. denies cp, palp or sob.    REVIEW OF SYSTEMS:  All other review of systems is negative unless indicated above    PHYSICAL EXAM:  Constitutional: Awake and alert, well-developed  HEENT: Normal Hearing, MMM  Neck: Soft and supple, No LAD, No JVD  Respiratory: Breath sounds are clear bilaterally, No wheezing, rales or rhonchi  Cardiovascular: S1 and S2, regular rate and rhythm,+ murmur   Gastrointestinal: Bowel Sounds present, soft, nontender, nondistended, no guarding, no rebound  Extremities: No peripheral edema  Vascular: 2+ peripheral pulses  Neurological: A/O x 3, no focal deficits  Musculoskeletal: 5/5 strength b/l upper and lower extremities  Skin: No rashes    Vital Signs Last 24 Hrs  T(C): 36.9 (24 Jan 2021 09:37), Max: 36.9 (24 Jan 2021 09:37)  T(F): 98.5 (24 Jan 2021 09:37), Max: 98.5 (24 Jan 2021 09:37)  HR: 71 (24 Jan 2021 09:37) (66 - 80)  BP: 113/77 (24 Jan 2021 09:37) (113/77 - 131/67)  BP(mean): --  RR: 18 (24 Jan 2021 09:37) (18 - 20)  SpO2: 96% (24 Jan 2021 09:37) (94% - 96%) -- room air     LABS: All Labs Reviewed:    01-24    140  |  110<H>  |  11  ----------------------------<  82  4.1   |  25  |  0.65    Ca    7.8<L>      24 Jan 2021 07:20  Mg     2.2     01-24    ECHOCARDIOGRAM:  < from: LAZ Complete w/Spect and Color (01.21.21 @ 08:34) >   Moderate calcification of the posterior mitral valve leaflet.   Moderate mitralinsufficiency.   The aortic valve is sclerotic.   Trace tricuspid valve regurgitation is present.   Pulmonic valve not well seen.   The left atrium is moderately dilated.   The left ventricle is normal in size, wall thickness, wall motion and   contractility.   Normal appearing right atrium.   Normal appearing right ventricle structure and function.   No intracardiac masses, thrombi or vegetations were seen.   Left atrial appendage is free of thrombus.   Intact interatrial septum.   Moderate atherosclerosis of the thoracic aorta.  < end of copied text >    MEDICATIONS  (STANDING):  apixaban 5 milliGRAM(s) Oral every 12 hours  budesonide 160 MICROgram(s)/formoterol 4.5 MICROgram(s) Inhaler 2 Puff(s) Inhalation two times a day  dofetilide 250 MICROGram(s) Oral every 12 hours  folic acid 1 milliGRAM(s) Oral daily  latanoprost 0.005% Ophthalmic Solution 1 Drop(s) Both EYES at bedtime  metoprolol succinate  milliGRAM(s) Oral daily  pantoprazole    Tablet 40 milliGRAM(s) Oral before breakfast  valsartan 160 milliGRAM(s) Oral daily    MEDICATIONS  (PRN):  acetaminophen   Tablet .. 650 milliGRAM(s) Oral Once PRN Mild Pain (1 - 3)  aluminum hydroxide/magnesium hydroxide/simethicone Suspension 30 milliLiter(s) Oral Once PRN Dyspepsia  ondansetron Injectable 4 milliGRAM(s) IV Push Once PRN Nausea  zolpidem 5 milliGRAM(s) Oral at bedtime PRN Insomnia    TELEMETRY REVIEW:  1/22 - sinus 50-70s QTC < 500  1/23 - sinus, QTC < 500 (492 on tele monitor)   1/24: , sinus     ASSESSMENT AND PLAN:    69 y/o female with PMHX of PAF on Eliquis, HTN, HLD, and obesity s/p gastric bypass surgery who presented to  for planned LAZ cardioversion for AFIB.  Decision to admit for TIKOSYN loading as patient high risk for recurrent AFIB.      1) Paroxysmal AFib     - S/p LAZ, cardioversion today with return to NSR.    - Starting TIKOSYN-- monitoring inpatient x72 hours.  Plan d/c Monday if stable.    - Cont Toprol xl 150 QD  - reduced.  - Keep K4, Mg2  - Cont Eliquis for stroke PPX    - Cardio, EP f/u appreciated     2) GERD - Cont Protonix       3) HTN - Cont home meds, BP optimal     4) HLD - Cont statin    5) Obesity s/p Gastric Bypass - Stable.      6) DVT PPX -  Eliquis     Dispo: monitor on tele. Plan d/c tm.

## 2021-01-24 NOTE — DISCHARGE NOTE PROVIDER - CARE PROVIDERS DIRECT ADDRESSES
[email or web address]

## 2021-01-24 NOTE — PROGRESS NOTE ADULT - SUBJECTIVE AND OBJECTIVE BOX
Patient is a 70y old  Female who presents with a chief complaint of AFIB cardioversion (23 Jan 2021 08:22)    QTc 459 msec yesterday EKG today pending , Pat had breif SVT but otherwise NSR     MEDICATIONS  (STANDING):  ALBUTerol    90 MICROgram(s) HFA Inhaler 2 Puff(s) Inhalation every 6 hours  apixaban 5 milliGRAM(s) Oral every 12 hours  budesonide 160 MICROgram(s)/formoterol 4.5 MICROgram(s) Inhaler 2 Puff(s) Inhalation two times a day  dofetilide 250 MICROGram(s) Oral every 12 hours  folic acid 1 milliGRAM(s) Oral daily  latanoprost 0.005% Ophthalmic Solution 1 Drop(s) Both EYES at bedtime  metoprolol succinate  milliGRAM(s) Oral daily  pantoprazole    Tablet 40 milliGRAM(s) Oral before breakfast  valsartan 160 milliGRAM(s) Oral daily    MEDICATIONS  (PRN):  acetaminophen   Tablet .. 650 milliGRAM(s) Oral Once PRN Mild Pain (1 - 3)  aluminum hydroxide/magnesium hydroxide/simethicone Suspension 30 milliLiter(s) Oral Once PRN Dyspepsia  ondansetron Injectable 4 milliGRAM(s) IV Push Once PRN Nausea  zolpidem 5 milliGRAM(s) Oral at bedtime PRN Insomnia            Vital Signs Last 24 Hrs  T(C): 36.6 (23 Jan 2021 20:37), Max: 36.6 (23 Jan 2021 20:37)  T(F): 97.8 (23 Jan 2021 20:37), Max: 97.8 (23 Jan 2021 20:37)  HR: 66 (23 Jan 2021 20:37) (66 - 80)  BP: 131/67 (23 Jan 2021 20:37) (131/67 - 131/67)  BP(mean): --  RR: 20 (23 Jan 2021 20:37) (20 - 20)  SpO2: 96% (23 Jan 2021 20:37) (94% - 96%)            INTERPRETATION OF TELEMETRY:    ECG:        LABS:                        11.6   6.68  )-----------( 210      ( 22 Jan 2021 08:39 )             35.7     01-23    140  |  109<H>  |  9   ----------------------------<  80  4.3   |  26  |  0.67    Ca    8.1<L>      23 Jan 2021 07:23  Phos  2.5     01-22  Mg     2.2     01-23              I&O's Summary    BNP  RADIOLOGY & ADDITIONAL STUDIES:

## 2021-01-24 NOTE — PROGRESS NOTE ADULT - GASTROINTESTINAL
Hide Include Location In Plan Question?: No negative Detail Level: Generalized Soft, non-tender, no hepatosplenomegaly, normal bowel sounds

## 2021-01-24 NOTE — DISCHARGE NOTE PROVIDER - CARE PROVIDER_API CALL
Jonah Larsen)  Cardiovascular Disease; Nuclear Cardiology  [address] [place] [address]  Phone: [phone]  Fax: [phone]  Follow Up Time:     Joseph Elise)  Cardiac Electrophysiology; Cardiovascular Disease; Internal Medicine  [address] [place] [address]  Phone: [phone]  Fax: [phone]  Follow Up Time:    Joseph Elise)  Cardiac Electrophysiology; Cardiovascular Disease; Internal Medicine  [address] [place] [address]  Phone: [phone]  Fax: [phone]  Follow Up Time:     Tere Turner)  Cardiovascular Disease; Internal Medicine  [address] [place] [address]  Phone: [phone]  Fax: [phone]  Follow Up Time:

## 2021-01-24 NOTE — DISCHARGE NOTE PROVIDER - NSDCCAREPROVSEEN_GEN_ALL_CORE_FT
Dorita Irving (Nurse Practitioner)  Jonah Larsen (Cardiologist)  Dr. Elise (Cardiac Electrophysiologist) Dorita Irving (Nurse Practitioner)  Dr. Turner (Cardiologist)  Dr. Elise (Cardiac Electrophysiologist)

## 2021-01-24 NOTE — DISCHARGE NOTE PROVIDER - HOSPITAL COURSE
CHIEF COMPLAINT/DIAGNOSIS: admitted for LAZ cardioversion for AFIB, now receiving Tikosyn loading     HPI: 71 y/o female with PMHX of PAF on Eliquis, HTN, HLD, and obesity s/p gastric bypass surgery who presented to  for planned LAZ cardioversion for AFIB.  Patient was recently dx with AFIB in JAN 2021; however, patient has been having ongoing palpitations for the last few months.  Patient notes she has a hx of GERD and was having palpitations/ epigastric pain off and on for months.  Patient was also having intermittent SOB with exertion.  Patient ultimately found to have PAF and was started on Eliquis by Dr. Turner and was set up for LAZ/ Cardioversion due to persistent AFIB on rate control agents.  Patient had successful cardioversion this morning but EP was consulted with concern of recurrent AFIB.  Decision was made for admission for starting TIKOSYN.      1/25 - no complaints. feeling well. eager to be discharge. denies cp, palp or sob.  REVIEW OF SYSTEMS:  All other review of systems is negative unless indicated above    PHYSICAL EXAM:  Constitutional: Awake and alert, well-developed  HEENT: Normal Hearing, MMM  Neck: Soft and supple, No LAD, No JVD  Respiratory: Breath sounds are clear bilaterally, No wheezing, rales or rhonchi  Cardiovascular: S1 and S2, regular rate and rhythm,+ murmur   Gastrointestinal: Bowel Sounds present, soft, nontender, nondistended, no guarding, no rebound  Extremities: No peripheral edema  Vascular: 2+ peripheral pulses  Neurological: A/O x 3, no focal deficits  Musculoskeletal: 5/5 strength b/l upper and lower extremities  Skin: No rashes    Vital Signs. Labs. Echo. Meds. Tele - all reviewed     ASSESSMENT AND PLAN:    71 y/o female with PMHX of PAF on Eliquis, HTN, HLD, and obesity s/p gastric bypass surgery who presented to  for planned LAZ cardioversion for AFIB.  Decision to admit for TIKOSYN loading as patient high risk for recurrent AFIB.      1) Paroxysmal AFib     - S/p LAZ, cardioversion today with return to NSR.    - Starting TIKOSYN-- monitoring inpatient x72 hours.   - Cont Toprol xl 150 QD  - reduced.  - Keep K4, Mg2  - on PO magnesium outpatient - d/w cardio cont. 200mg daily  - Cont Eliquis for stroke PPX    - Cardio, EP f/u appreciated -- outpatient f/u     2) GERD - Cont Protonix       3) HTN - Cont home meds, BP optimal     4) HLD - Cont statin    5) Obesity s/p Gastric Bypass - Stable.      6) DVT PPX -  Eliquis     Dispo: monitor on tele. Plan d/c home today w/ Tikosyin, BB dose reduced, magnesium suppl daily. f/u cardio and EP.    Dispo: discharge to home in stable condition    Final diagnosis, treatment plan, and follow-up recommendations were discussed and explained to the patient. The patient was given an opportunity to ask questions concerning the diagnosis and treatment plan. The patient acknowledged understanding of the diagnosis, treatment, and follow-up recommendations. The patient was advised to seek urgent care upon discharge if worsening symptoms develop prior to scheduled follow-up. Time spent on discharge included time with the patient, and also coordinating discharge care as outlined below.    Total time spent: 50 min

## 2021-01-24 NOTE — DISCHARGE NOTE PROVIDER - NSDCMRMEDTOKEN_GEN_ALL_CORE_FT
albuterol: 90 microgram(s) inhaled every 6 hours, As Needed  Eliquis 5 mg oral tablet: 1 tab(s) orally 2 times a day  folic acid: 1 tab(s) orally once a day  latanoprost 0.005% ophthalmic solution: 1 drop(s) to each affected eye once a day (in the evening)  magnesium: 1 tab(s) orally once a day  Metoprolol Succinate ER: 150 milligram(s) orally once a day  pantoprazole 40 mg oral delayed release tablet: 1 tab(s) orally once a day  Prolia 60 mg/mL subcutaneous solution: subcutaneous every 6 months  Symbicort 160 mcg-4.5 mcg/inh inhalation aerosol: 2 puff(s) inhaled 2 times a day, As Needed  valsartan 160 mg oral tablet: 1 tab(s) orally once a day  Vitamin B12: 1 tab(s) orally once a day  Vitamin D3 1000 intl units (25 mcg) oral tablet: orally once a day  Zinc: 1 tab(s) orally once a day  zolpidem 5 mg oral tablet: 1 tab(s) orally once a day (at bedtime), As Needed   PROVIDER:[TOKEN:[[id number]]] albuterol: 90 microgram(s) inhaled every 6 hours, As Needed  dofetilide 250 mcg oral capsule: 1 cap(s) orally every 12 hours  Eliquis 5 mg oral tablet: 1 tab(s) orally 2 times a day  folic acid: 1 tab(s) orally once a day  latanoprost 0.005% ophthalmic solution: 1 drop(s) to each affected eye once a day (in the evening)  magnesium oxide 400 mg (241.3 mg elemental magnesium) oral tablet: 0.5 tab(s) orally once a day  Metoprolol Succinate ER: 150 milligram(s) orally once a day  pantoprazole 40 mg oral delayed release tablet: 1 tab(s) orally once a day  Prolia 60 mg/mL subcutaneous solution: subcutaneous every 6 months  Symbicort 160 mcg-4.5 mcg/inh inhalation aerosol: 2 puff(s) inhaled 2 times a day, As Needed  valsartan 160 mg oral tablet: 1 tab(s) orally once a day  Vitamin B12: 1 tab(s) orally once a day  Vitamin D3 1000 intl units (25 mcg) oral tablet: orally once a day  Zinc: 1 tab(s) orally once a day  zolpidem 5 mg oral tablet: 1 tab(s) orally once a day (at bedtime), As Needed

## 2021-01-25 ENCOUNTER — TRANSCRIPTION ENCOUNTER (OUTPATIENT)
Age: 71
End: 2021-01-25

## 2021-01-25 LAB
ANION GAP SERPL CALC-SCNC: 7 MMOL/L — SIGNIFICANT CHANGE UP (ref 5–17)
BUN SERPL-MCNC: 11 MG/DL — SIGNIFICANT CHANGE UP (ref 7–23)
CALCIUM SERPL-MCNC: 8.6 MG/DL — SIGNIFICANT CHANGE UP (ref 8.5–10.1)
CHLORIDE SERPL-SCNC: 112 MMOL/L — HIGH (ref 96–108)
CO2 SERPL-SCNC: 24 MMOL/L — SIGNIFICANT CHANGE UP (ref 22–31)
CREAT SERPL-MCNC: 0.65 MG/DL — SIGNIFICANT CHANGE UP (ref 0.5–1.3)
GLUCOSE SERPL-MCNC: 76 MG/DL — SIGNIFICANT CHANGE UP (ref 70–99)
MAGNESIUM SERPL-MCNC: 2.2 MG/DL — SIGNIFICANT CHANGE UP (ref 1.6–2.6)
POTASSIUM SERPL-MCNC: 4.2 MMOL/L — SIGNIFICANT CHANGE UP (ref 3.5–5.3)
POTASSIUM SERPL-SCNC: 4.2 MMOL/L — SIGNIFICANT CHANGE UP (ref 3.5–5.3)
SODIUM SERPL-SCNC: 143 MMOL/L — SIGNIFICANT CHANGE UP (ref 135–145)

## 2021-01-25 PROCEDURE — 99239 HOSP IP/OBS DSCHRG MGMT >30: CPT

## 2021-01-25 PROCEDURE — 93010 ELECTROCARDIOGRAM REPORT: CPT

## 2021-01-25 RX ORDER — MAGNESIUM OXIDE 400 MG ORAL TABLET 241.3 MG
200 TABLET ORAL DAILY
Refills: 0 | Status: DISCONTINUED | OUTPATIENT
Start: 2021-01-25 | End: 2021-01-25

## 2021-01-25 RX ORDER — MAGNESIUM OXIDE 400 MG ORAL TABLET 241.3 MG
0.5 TABLET ORAL
Qty: 15 | Refills: 0
Start: 2021-01-25 | End: 2021-02-23

## 2021-01-25 RX ADMIN — APIXABAN 5 MILLIGRAM(S): 2.5 TABLET, FILM COATED ORAL at 10:28

## 2021-01-25 RX ADMIN — Medication 1 MILLIGRAM(S): at 10:28

## 2021-01-25 RX ADMIN — VALSARTAN 160 MILLIGRAM(S): 80 TABLET ORAL at 10:28

## 2021-01-25 RX ADMIN — DOFETILIDE 250 MICROGRAM(S): 0.25 CAPSULE ORAL at 06:28

## 2021-01-25 RX ADMIN — MAGNESIUM OXIDE 400 MG ORAL TABLET 200 MILLIGRAM(S): 241.3 TABLET ORAL at 10:28

## 2021-01-25 RX ADMIN — PANTOPRAZOLE SODIUM 40 MILLIGRAM(S): 20 TABLET, DELAYED RELEASE ORAL at 06:28

## 2021-01-25 RX ADMIN — Medication 150 MILLIGRAM(S): at 10:28

## 2021-01-25 NOTE — PROGRESS NOTE ADULT - SUBJECTIVE AND OBJECTIVE BOX
Patient is a 70y old  Female who presents with a chief complaint of AFIB cardioversion.       HPI:  71 y/o female with PMHX of PAF on eliquis, HTN, HLD, and obesity s/p gastric bypass surgery who presented to  for planned LAZ cardioversion for AFIB.  Patient was recently dx with AFIB in 2021; however, patient has been having ongoing palpitations for the last few months.   Pt was evaluated by me as outpt and she was poorly rate controlled with max dose rate control agents.  -   Pt this am was shocked with 150J and she revered back to afib again and underwent another 150J CV.  Spoke with EP team to evaluate her for tikosyn load.    N oCP or SOB.     - pt seen this am.  No overnight events.  Recieved 2 D doses of tikosyn.    - pt seen this am. Pt denies any symptoms , ambulating in room.     PAST MEDICAL & SURGICAL HISTORY:  GERD (gastroesophageal reflux disease)  Macular degeneration  Phlebitis and thrombophlebitis of lower extremity  Glaucoma  Hyperlipidemia, unspecified hyperlipidemia type  Essential hypertension  History of breast lift  H/O abdominoplasty  History of ovarian cystectomy  Atrial fibrillation status post cardioversion  H/O vein stripping  History of gastric bypass    FAMILY HISTORY:  Mother:   young age cerebral hemorrhage  Father:   in 60's-- extensive hx of CAD starting in 40's.      Social History:    Rare wine intake  No tob  Lives at home with family    Allergies:    fentanyl (Other)  narcotic analgesics (Other)    Home Medications:  albuterol:  (2021 12:56)  Eliquis 5 mg oral tablet: 1 tab(s) orally 2 times a day (2021 12:56)  folic acid:  (2021 12:56)  latanoprost 0.005% ophthalmic solution: 1 drop(s) to each affected eye once a day (in the evening) (2021 12:56)  magnesium:  (2021 12:56)  Metoprolol Succinate ER: 150 milligram(s) orally 2 times a day (2021 12:56)  pantoprazole 40 mg oral delayed release tablet: 1 tab(s) orally once a day (2021 12:56)  Prolia 60 mg/mL subcutaneous solution: subcutaneous every 6 months (2021 12:56)  Symbicort 160 mcg-4.5 mcg/inh inhalation aerosol: 2 puff(s) inhaled 2 times a day, As Needed (2021 12:56)  valsartan 160 mg oral tablet: 1 tab(s) orally once a day (2021 12:56)  Vitamin B12: orally once a day (2021 12:56)  Vitamin D3 1000 intl units (25 mcg) oral tablet: orally once a day (2021 12:56)  Zinc: orally once a day (2021 12:56)  zolpidem 5 mg oral tablet: 1 tab(s) orally once a day (at bedtime), As Needed (2021 12:56)          PAST MEDICAL & SURGICAL HISTORY:  GERD (gastroesophageal reflux disease)    Macular degeneration    Phlebitis and thrombophlebitis of lower extremity    Glaucoma    Hyperlipidemia, unspecified hyperlipidemia type    Essential hypertension    History of breast lift    H/O abdominoplasty    History of ovarian cystectomy    Atrial fibrillation status post cardioversion    H/O vein stripping    History of gastric bypass        MEDICATIONS  (STANDING):  apixaban 5 milliGRAM(s) Oral every 12 hours  budesonide 160 MICROgram(s)/formoterol 4.5 MICROgram(s) Inhaler 2 Puff(s) Inhalation two times a day  dofetilide 250 MICROGram(s) Oral every 12 hours  folic acid 1 milliGRAM(s) Oral daily  latanoprost 0.005% Ophthalmic Solution 1 Drop(s) Both EYES at bedtime  metoprolol succinate  milliGRAM(s) Oral daily  pantoprazole    Tablet 40 milliGRAM(s) Oral before breakfast  valsartan 160 milliGRAM(s) Oral daily    MEDICATIONS  (PRN):  acetaminophen   Tablet .. 650 milliGRAM(s) Oral Once PRN Mild Pain (1 - 3)  aluminum hydroxide/magnesium hydroxide/simethicone Suspension 30 milliLiter(s) Oral Once PRN Dyspepsia  ondansetron Injectable 4 milliGRAM(s) IV Push Once PRN Nausea  zolpidem 5 milliGRAM(s) Oral at bedtime PRN Insomnia      FAMILY HISTORY:      SOCIAL HISTORY: no recent smoking     REVIEW OF SYSTEMS:  CONSTITUTIONAL:    No fatigue, malaise, lethargy.  No fever or chills.  RESPIRATORY:  No cough.  No wheeze.  No hemoptysis.  No shortness of breath.  CARDIOVASCULAR:  No chest pains.  No palpitations. No shortness of breath, No orthopnea or PND.  GASTROINTESTINAL:  No abdominal pain.  No nausea or vomiting.    GENITOURINARY:    No hematuria.    MUSCULOSKELETAL:  No musculoskeletal pain.  No joint swelling.  No arthritis.  NEUROLOGICAL:  No tingling or numbness or weakness.  PSYCHIATRIC:  No confusion  SKIN:  No rashes.            ICU Vital Signs Last 24 Hrs  HR: 62 (2021 20:32) (62 - 62)  BP: -- 113/77        PHYSICAL EXAM-    Constitutional: no acute distress     Head: Head is normocephalic and atraumatic.      Neck: No jugular venous distention. No audible carotid bruits. There are strong carotid pulses bilaterally. No JVD.     Cardiovascular: Regular rate and rhythm without S3, S4. No murmurs or rubs are appreciated.      Respiratory: Breath sounds are normal. No rales. No wheezing.    Abdomen: Soft, nontender, nondistended with positive bowel sounds.      Extremity: No tenderness. No  pitting edema     Neurologic: The patient is alert and oriented.      Skin: No rash, no obvious lesions noted.      Psychiatric: The patient appears to be emotionally stable.      INTERPRETATION OF TELEMETRY: SR     ECG: Sinus rythm , first degree     I&O's Detail      LABS:                        143  |  112<H>  |  11  ----------------------------<  76  4.2   |  24  |  0.65    Ca    8.6      2021 06:36  Mg     2.2                               I&O's Summary    BNP  RADIOLOGY & ADDITIONAL STUDIES:  t< from: LAZ Complete w/Spect and Color (21 @ 08:34) >   EXAM:  LAZ COMP W SPECT AND COLOR#         PROCEDURE DATE:  2021        INTERPRETATION:  Transesophageal Echocardiography Report (LAZ)     Demographics     Patient Name      JIM LOPEZ       Age           70 year(s)     Med Rec #  [id number]              Gender        Female     Account #         [id number]           Date of Birth 1950     Interpreting      Padmini Hinojosa,   Room Number   0333   Physician         MD     Referring         Padmini Verduzco             Sonographer   Tayler Lynn,   Physician         MD Walt                   [facility]     Date of study     2021 08:08 AM     Height            162.6 in               Weight        203.05 pounds     The procedure was explained in detail to the patient. Risks,   complications and alternative treatments were reviewed. Written consent   was obtained.    Type of Study:     LAZ procedure: LAZ COMP W SPECT AND COLOR#     HR: 120 bpmBP: 152/99 mmHg     Study Location: O/PTechnical Quality: Fair     Contrast Medium: Bubble Study.    Indications   1) I48.91 - Unspecified artial fibrillation     Findings     Mitral Valve   Moderate calcification of the posterior mitral valve leaflet.   Moderate mitral insufficiency.     Aortic Valve   The aortic valve is sclerotic.     Tricuspid Valve   Trace tricuspid valve regurgitation is present.     Pulmonic Valve   Pulmonic valve not well seen.     Left Atrium   The left atrium is moderately dilated.     Left Ventricle   The left ventricle is normal in size, wall thickness, wall motion and   contractility.     Right Atrium   Normal appearing right atrium.     Right Ventricle   Normal appearing right ventricle structure and function.     Miscellaneous   No intracardiac masses, thrombi or vegetations were seen.   Left atrial appendage is free of thrombus.   Intact interatrial septum.   Moderate atherosclerosis of the thoracic aorta.     Impression     Summary     Moderate calcification of the posterior mitral valve leaflet.   Moderate mitralinsufficiency.   The aortic valve is sclerotic.   Trace tricuspid valve regurgitation is present.   Pulmonic valve not well seen.   The left atrium is moderately dilated.   The left ventricle is normal in size, wall thickness, wall motion and   contractility.   Normal appearing right atrium.   Normal appearing right ventricle structure and function.   No intracardiac masses, thrombi or vegetations were seen.   Left atrial appendage is free of thrombus.   Intact interatrial septum.   Moderate atherosclerosis of the thoracic aorta.     Signature     ----------------------------------------------------------------   Electronically signed by Padmini Hinojosa MD(Interpreting   physician) on 2021 09:53 AM   ----------------------------------------------------------------    Valves    Structures              PADMINI HINOJOSA MD; Attending Cardiologist  This document has been electronically signed. 2021  9:54AM    < end of copied text >

## 2021-01-25 NOTE — DISCHARGE NOTE NURSING/CASE MANAGEMENT/SOCIAL WORK - PATIENT PORTAL LINK FT
You can access the FollowMyHealth Patient Portal offered by [facility] by registering at the following website: [email or web address]. By joining [employer]’s FollowMyHealth portal, you will also be able to view your health information using other applications (apps) compatible with our system.

## 2021-01-25 NOTE — PROGRESS NOTE ADULT - SUBJECTIVE AND OBJECTIVE BOX
CHIEF COMPLAINT/DIAGNOSIS: admitted for LAZ cardioversion for AFIB, now receiving Tikosyn loading     HPI: 69 y/o female with PMHX of PAF on Eliquis, HTN, HLD, and obesity s/p gastric bypass surgery who presented to  for planned LAZ cardioversion for AFIB.  Patient was recently dx with AFIB in JAN 2021; however, patient has been having ongoing palpitations for the last few months.  Patient notes she has a hx of GERD and was having palpitations/ epigastric pain off and on for months.  Patient was also having intermittent SOB with exertion.  Patient ultimately found to have PAF and was started on Eliquis by Dr. Turner and was set up for LAZ/ Cardioversion due to persistent AFIB on rate control agents.  Patient had successful cardioversion this morning but EP was consulted with concern of recurrent AFIB.  Decision was made for admission for starting TIKOSYN.      1/22 - feeling well. had CP last night. no CP since episode. oob ambulating w/o issues. no sob or dizziness.   1/23 - had some wheezing this Am - will order inhalers (on symbicort/ albuterol at home) no other complaints. feeling well.  1/24 - no complaints. feeling well. eager to be discharge. denies cp, palp or sob.  1/25 -     REVIEW OF SYSTEMS:  All other review of systems is negative unless indicated above    PHYSICAL EXAM:  Constitutional: Awake and alert, well-developed  HEENT: Normal Hearing, MMM  Neck: Soft and supple, No LAD, No JVD  Respiratory: Breath sounds are clear bilaterally, No wheezing, rales or rhonchi  Cardiovascular: S1 and S2, regular rate and rhythm,+ murmur   Gastrointestinal: Bowel Sounds present, soft, nontender, nondistended, no guarding, no rebound  Extremities: No peripheral edema  Vascular: 2+ peripheral pulses  Neurological: A/O x 3, no focal deficits  Musculoskeletal: 5/5 strength b/l upper and lower extremities  Skin: No rashes    Vital Signs Last 24 Hrs  T(C): 36.9 (24 Jan 2021 09:37), Max: 36.9 (24 Jan 2021 09:37)  T(F): 98.5 (24 Jan 2021 09:37), Max: 98.5 (24 Jan 2021 09:37)  HR: 62 (24 Jan 2021 20:32) (62 - 71)  BP: 113/77 (24 Jan 2021 09:37) (113/77 - 113/77)  BP(mean): --  RR: 18 (24 Jan 2021 09:37) (18 - 18)  SpO2: 96% (24 Jan 2021 09:37) (96% - 96%) -- room air     LABS: All Labs Reviewed:    01-25    143  |  112<H>  |  11  ----------------------------<  76  4.2   |  24  |  0.65    Ca    8.6      25 Jan 2021 06:36  Mg     2.2     01-25      ECHOCARDIOGRAM:  < from: LAZ Complete w/Spect and Color (01.21.21 @ 08:34) >   Moderate calcification of the posterior mitral valve leaflet.   Moderate mitralinsufficiency.   The aortic valve is sclerotic.   Trace tricuspid valve regurgitation is present.   Pulmonic valve not well seen.   The left atrium is moderately dilated.   The left ventricle is normal in size, wall thickness, wall motion and   contractility.   Normal appearing right atrium.   Normal appearing right ventricle structure and function.   No intracardiac masses, thrombi or vegetations were seen.   Left atrial appendage is free of thrombus.   Intact interatrial septum.   Moderate atherosclerosis of the thoracic aorta.  < end of copied text >    MEDICATIONS  (STANDING):  apixaban 5 milliGRAM(s) Oral every 12 hours  budesonide 160 MICROgram(s)/formoterol 4.5 MICROgram(s) Inhaler 2 Puff(s) Inhalation two times a day  dofetilide 250 MICROGram(s) Oral every 12 hours  folic acid 1 milliGRAM(s) Oral daily  latanoprost 0.005% Ophthalmic Solution 1 Drop(s) Both EYES at bedtime  metoprolol succinate  milliGRAM(s) Oral daily  pantoprazole    Tablet 40 milliGRAM(s) Oral before breakfast  valsartan 160 milliGRAM(s) Oral daily    MEDICATIONS  (PRN):  acetaminophen   Tablet .. 650 milliGRAM(s) Oral Once PRN Mild Pain (1 - 3)  aluminum hydroxide/magnesium hydroxide/simethicone Suspension 30 milliLiter(s) Oral Once PRN Dyspepsia  ondansetron Injectable 4 milliGRAM(s) IV Push Once PRN Nausea  zolpidem 5 milliGRAM(s) Oral at bedtime PRN Insomnia    TELEMETRY REVIEW:  1/22 - sinus 50-70s QTC < 500  1/23 - sinus, QTC < 500 (492 on tele monitor)   1/24: , sinus   1/25: sinus 60-70, frequent PACs/ PATs, QTC stable.    ASSESSMENT AND PLAN:    69 y/o female with PMHX of PAF on Eliquis, HTN, HLD, and obesity s/p gastric bypass surgery who presented to  for planned LAZ cardioversion for AFIB.  Decision to admit for TIKOSYN loading as patient high risk for recurrent AFIB.      1) Paroxysmal AFib     - S/p LAZ, cardioversion today with return to NSR.    - Starting TIKOSYN-- monitoring inpatient x72 hours.   - Cont Toprol xl 150 QD  - reduced.  - Keep K4, Mg2  - on PO magnesium outpatient - d/w cardio cont. 200mg daily  - Cont Eliquis for stroke PPX    - Cardio, EP f/u appreciated -- outpatient f/u     2) GERD - Cont Protonix       3) HTN - Cont home meds, BP optimal     4) HLD - Cont statin    5) Obesity s/p Gastric Bypass - Stable.      6) DVT PPX -  Eliquis     Dispo: monitor on tele. Plan d/c home today w/ Tikosyin, BB dose reduced, magnesium suppl daily. f/u cardio and EP.     CHIEF COMPLAINT/DIAGNOSIS: admitted for LAZ cardioversion for AFIB, now receiving Tikosyn loading     HPI: 69 y/o female with PMHX of PAF on Eliquis, HTN, HLD, and obesity s/p gastric bypass surgery who presented to  for planned LAZ cardioversion for AFIB.  Patient was recently dx with AFIB in JAN 2021; however, patient has been having ongoing palpitations for the last few months.  Patient notes she has a hx of GERD and was having palpitations/ epigastric pain off and on for months.  Patient was also having intermittent SOB with exertion.  Patient ultimately found to have PAF and was started on Eliquis by Dr. Turner and was set up for LAZ/ Cardioversion due to persistent AFIB on rate control agents.  Patient had successful cardioversion this morning but EP was consulted with concern of recurrent AFIB.  Decision was made for admission for starting TIKOSYN.      1/25 - no complaints. feeling well. eager to be discharge. denies cp, palp or sob.  REVIEW OF SYSTEMS:  All other review of systems is negative unless indicated above    PHYSICAL EXAM:  Constitutional: Awake and alert, well-developed  HEENT: Normal Hearing, MMM  Neck: Soft and supple, No LAD, No JVD  Respiratory: Breath sounds are clear bilaterally, No wheezing, rales or rhonchi  Cardiovascular: S1 and S2, regular rate and rhythm,+ murmur   Gastrointestinal: Bowel Sounds present, soft, nontender, nondistended, no guarding, no rebound  Extremities: No peripheral edema  Vascular: 2+ peripheral pulses  Neurological: A/O x 3, no focal deficits  Musculoskeletal: 5/5 strength b/l upper and lower extremities  Skin: No rashes    Vital Signs Last 24 Hrs  T(C): 36.9 (24 Jan 2021 09:37), Max: 36.9 (24 Jan 2021 09:37)  T(F): 98.5 (24 Jan 2021 09:37), Max: 98.5 (24 Jan 2021 09:37)  HR: 62 (24 Jan 2021 20:32) (62 - 71)  BP: 113/77 (24 Jan 2021 09:37) (113/77 - 113/77)  BP(mean): --  RR: 18 (24 Jan 2021 09:37) (18 - 18)  SpO2: 96% (24 Jan 2021 09:37) (96% - 96%) -- room air     LABS: All Labs Reviewed:    01-25    143  |  112<H>  |  11  ----------------------------<  76  4.2   |  24  |  0.65    Ca    8.6      25 Jan 2021 06:36  Mg     2.2     01-25      ECHOCARDIOGRAM:  < from: LAZ Complete w/Spect and Color (01.21.21 @ 08:34) >   Moderate calcification of the posterior mitral valve leaflet.   Moderate mitralinsufficiency.   The aortic valve is sclerotic.   Trace tricuspid valve regurgitation is present.   Pulmonic valve not well seen.   The left atrium is moderately dilated.   The left ventricle is normal in size, wall thickness, wall motion and   contractility.   Normal appearing right atrium.   Normal appearing right ventricle structure and function.   No intracardiac masses, thrombi or vegetations were seen.   Left atrial appendage is free of thrombus.   Intact interatrial septum.   Moderate atherosclerosis of the thoracic aorta.  < end of copied text >    MEDICATIONS  (STANDING):  apixaban 5 milliGRAM(s) Oral every 12 hours  budesonide 160 MICROgram(s)/formoterol 4.5 MICROgram(s) Inhaler 2 Puff(s) Inhalation two times a day  dofetilide 250 MICROGram(s) Oral every 12 hours  folic acid 1 milliGRAM(s) Oral daily  latanoprost 0.005% Ophthalmic Solution 1 Drop(s) Both EYES at bedtime  metoprolol succinate  milliGRAM(s) Oral daily  pantoprazole    Tablet 40 milliGRAM(s) Oral before breakfast  valsartan 160 milliGRAM(s) Oral daily    MEDICATIONS  (PRN):  acetaminophen   Tablet .. 650 milliGRAM(s) Oral Once PRN Mild Pain (1 - 3)  aluminum hydroxide/magnesium hydroxide/simethicone Suspension 30 milliLiter(s) Oral Once PRN Dyspepsia  ondansetron Injectable 4 milliGRAM(s) IV Push Once PRN Nausea  zolpidem 5 milliGRAM(s) Oral at bedtime PRN Insomnia    TELEMETRY REVIEW:  1/22 - sinus 50-70s QTC < 500  1/23 - sinus, QTC < 500 (492 on tele monitor)   1/24: , sinus   1/25: sinus 60-70, frequent PACs/ PATs, QTC stable.    ASSESSMENT AND PLAN:    69 y/o female with PMHX of PAF on Eliquis, HTN, HLD, and obesity s/p gastric bypass surgery who presented to  for planned LAZ cardioversion for AFIB.  Decision to admit for TIKOSYN loading as patient high risk for recurrent AFIB.      1) Paroxysmal AFib     - S/p LAZ, cardioversion today with return to NSR.    - Starting TIKOSYN-- monitoring inpatient x72 hours.   - Cont Toprol xl 150 QD  - reduced.  - Keep K4, Mg2  - on PO magnesium outpatient - d/w cardio cont. 200mg daily  - Cont Eliquis for stroke PPX    - Cardio, EP f/u appreciated -- outpatient f/u     2) GERD - Cont Protonix       3) HTN - Cont home meds, BP optimal     4) HLD - Cont statin    5) Obesity s/p Gastric Bypass - Stable.      6) DVT PPX -  Eliquis     Dispo: monitor on tele. Plan d/c home today w/ Tikosyin, BB dose reduced, magnesium suppl daily. f/u cardio and EP.

## 2021-01-25 NOTE — PROGRESS NOTE ADULT - SUBJECTIVE AND OBJECTIVE BOX
HPI:   71 yo female PMH HTN, HLD, obesity h/o gastric bypass, new onset afib diagnosed in beginning of this month,  presented for outpatient LAZ guided cardioversion with Dr Godoy.  She reports for the past two months increased acid reflux and feeling chest pounding with sob and LUO.  She underwent LAZ/Cardioversion this morning with conversion to sinus rhythm.   EP consulted for evaluation for oral antiarrhythmic agent.        2021: Patient s/p 6/6 Tikosyn doses, tolerating medication.  Patient had some diarrhea yesterday, resolved with Imodium  Denies any CP, palpitations, SOB, dizziness, lightheadedness.  Noted a few seconds of "fluttering" this AM.  TELE: NSR 60s, brief AF episodes lasting a few seconds  [id number]  NSR@64bpm  AK:184ms  QRS:102ms  QT/QTc:448/462ms    MEDICATIONS  (STANDING):  ALBUTerol    90 MICROgram(s) HFA Inhaler 2 Puff(s) Inhalation every 6 hours  apixaban 5 milliGRAM(s) Oral every 12 hours  budesonide 160 MICROgram(s)/formoterol 4.5 MICROgram(s) Inhaler 2 Puff(s) Inhalation two times a day  dofetilide 250 MICROGram(s) Oral every 12 hours  folic acid 1 milliGRAM(s) Oral daily  latanoprost 0.005% Ophthalmic Solution 1 Drop(s) Both EYES at bedtime  magnesium oxide 200 milliGRAM(s) Oral daily  metoprolol succinate  milliGRAM(s) Oral daily  mineral oil enema 133 milliLiter(s) Rectal once  pantoprazole    Tablet 40 milliGRAM(s) Oral before breakfast  valsartan 160 milliGRAM(s) Oral daily    MEDICATIONS  (PRN):  acetaminophen   Tablet .. 650 milliGRAM(s) Oral Once PRN Mild Pain (1 - 3)  aluminum hydroxide/magnesium hydroxide/simethicone Suspension 30 milliLiter(s) Oral Once PRN Dyspepsia  ondansetron Injectable 4 milliGRAM(s) IV Push Once PRN Nausea  zolpidem 5 milliGRAM(s) Oral at bedtime PRN Insomnia      Vital Signs Last 24 Hrs  T(C): 36.9 (2021 09:37), Max: 36.9 (2021 09:37)  T(F): 98.5 (2021 09:37), Max: 98.5 (2021 09:37)  HR: 62 (2021 20:32) (62 - 71)  BP: 113/77 (2021 09:37) (113/77 - 113/77)  BP(mean): --  RR: 18 (2021 09:37) (18 - 18)  SpO2: 96% (2021 09:37) (96% - 96%)    PHYSICAL EXAMINATION:  GENERAL: NAD, A&Ox3  HEAD:  Atraumatic, Normocephalic  EYES: EOMI, PERRLA, conjunctiva and sclera clear  NECK: Supple and normal thyroid.  No JVD or carotid bruit.  Carotid pulse is 2+ bilaterally.  HEART: Regular rate and rhythm; No murmurs, rubs, or gallops.  PULMONARY: Clear to auscultation and perfusion.  No rales, wheezing, or rhonchi bilaterally.  ABDOMEN: Soft, Nontender, Nondistended; Bowel sounds present  EXTREMITIES: B/L +1 edema, chronic  NEUROLOGICAL: Grossly nonfocal        143  |  112<H>  |  11  ----------------------------<  76  4.2   |  24  |  0.65    Ca    8.6      2021 06:36  Mg     2.2           Cardiology:  < from: LAZ Complete w/Spect and Color (21 @ 08:34) >   Impression     Summary     Moderate calcification of the posterior mitral valve leaflet.   Moderate mitralinsufficiency.   The aortic valve is sclerotic.   Trace tricuspid valve regurgitation is present.   Pulmonic valve not well seen.   The left atrium is moderately dilated.   The left ventricle is normal in size, wall thickness, wall motion and   contractility.   Normal appearing right atrium.   Normal appearing right ventricle structure and function.   No intracardiac masses, thrombi or vegetations were seen.   Left atrial appendage is free of thrombus.   Intact interatrial septum.   Moderate atherosclerosis of the thoracic aorta.

## 2021-01-25 NOTE — PROGRESS NOTE ADULT - ASSESSMENT
71 yo female PMH HTN, HLD, obesity h/o gastric bypass, new onset afib diagnosed in beginning of this month,  presented for outpatient LAZ guided cardioversion with Dr Godoy.  She reports for the past two months increased acid reflux and feeling chest pounding with sob and LUO.   Patient cardioverted and started on Tikosyn for rhythm control.  Patient has completed 6/6 Tikosyn loading doses with QTc <500ms.    Plan:  EKGs reviewed QTc <500ms  Continue Tikosyn 250mcg q 12H  Continue Toprol 150mg Daily  Continue Eliquis  Advised to monitor for further diarrhea and contact MD for persistent symptoms  Outpatient follow up with Dr. Turner and Dr. Elise at [facility] for discharge from EP standpoint  Dispo per medicine  Plan discussed with patient, hospitalist NP  
Afib with RVR - in SR  Tikosyn load finished.   no overnight events.   continue tikosyn and current dose of toprol.  Continue home dose of magnesium at discharge.   f/u with cardiology as outpt .   close monitoring of the renal function and electrolytes.  Goal potassium of 4 and magnesium of 2.       Hypokalemia- resolved.   HTN- bp stable.    Other medical issues- Management per primary team.   Thank you for allowing me to participate in the care of this patient. Please feel free to contact me with any questions. 
Afib with RVR - in SR  Toprol xl decreased to once daily   continue Tikosyn , Qtc 475 and 459 on 1/22 awating EKG this AM   close monitoring of the renal function and electrolytes.  Goal potassium of 4 and magnesium of 2.       Hypokalemia- ordered po potassium.     HTN- bp stable.  DC on Monday after load complete
Afib with RVR - in SR  Toprol xl decreased to once daily   tikosyn to continue , ekg s to asses QT interval before and after the dose per protocol.  close monitoring of the renal function and electrolytes.  Goal potassium of 4 and magnesium of 2.       Hypokalemia- ordered po potassium.     HTN- bp stable.    Other medical issues- Management per primary team.   Thank you for allowing me to participate in the care of this patient. Please feel free to contact me with any questions. 
Afib with RVR - in SR  Toprol xl decreased to once daily   continue Tikosyn , Qtc 475   close monitoring of the renal function and electrolytes.  Goal potassium of 4 and magnesium of 2.       Hypokalemia- ordered po potassium.     HTN- bp stable.  DC on Monday after load complete

## 2021-01-25 NOTE — PROGRESS NOTE ADULT - PROVIDER SPECIALTY LIST ADULT
Hospitalist
Cardiology
Hospitalist
Cardiology
Electrophysiology
Cardiology
Cardiology

## 2021-01-25 NOTE — PROGRESS NOTE ADULT - REASON FOR ADMISSION
AFIB cardioversion

## 2021-01-25 NOTE — DISCHARGE NOTE NURSING/CASE MANAGEMENT/SOCIAL WORK - NSDPDISTO_GEN_ALL_CORE
Family Psychotherapy (PhD/LCSW)    2/3/2020    Site: [facility]    Length of service: 45    Therapeutic intervention: 90893-Family therapy with patient; needed because inclusion of wife in healing process per pt parkinson's disease.     Persons present: spouse and pt  Whitley    Interval history:      Metaphors shared miguel and chessboard.   Self as context.  They may record a mindfulness session next time.   He had his honorary event last week and he smiled a lot.                Target symptoms: depression, anxiety      Patient's interpersonal/verbal exchanges: 97589-Family therapy with patient:  active listening and self-disclosure    Progress toward goals: progressing well    Diagnosis: generalized anxiety disorder    Plan: individual psychotherapy  family psychotherapy      Return to clinic: as scheduled   Home

## 2021-01-25 NOTE — PHARMACOTHERAPY INTERVENTION NOTE - COMMENTS
as per [facility] pharmacy copay $240 due to patient's deductible - patient is aware and agreeable.  Spoke to patient on the phone all questions and concerns answered

## 2021-01-27 DIAGNOSIS — Z82.49 FAMILY HISTORY OF ISCHEMIC HEART DISEASE AND OTHER DISEASES OF THE CIRCULATORY SYSTEM: ICD-10-CM

## 2021-01-27 DIAGNOSIS — Z79.01 LONG TERM (CURRENT) USE OF ANTICOAGULANTS: ICD-10-CM

## 2021-01-27 DIAGNOSIS — K21.9 GASTRO-ESOPHAGEAL REFLUX DISEASE WITHOUT ESOPHAGITIS: ICD-10-CM

## 2021-01-27 DIAGNOSIS — I48.0 PAROXYSMAL ATRIAL FIBRILLATION: ICD-10-CM

## 2021-01-27 DIAGNOSIS — H40.9 UNSPECIFIED GLAUCOMA: ICD-10-CM

## 2021-01-27 DIAGNOSIS — E78.5 HYPERLIPIDEMIA, UNSPECIFIED: ICD-10-CM

## 2021-01-27 DIAGNOSIS — I34.0 NONRHEUMATIC MITRAL (VALVE) INSUFFICIENCY: ICD-10-CM

## 2021-01-27 DIAGNOSIS — H35.30 UNSPECIFIED MACULAR DEGENERATION: ICD-10-CM

## 2021-01-27 DIAGNOSIS — I10 ESSENTIAL (PRIMARY) HYPERTENSION: ICD-10-CM

## 2021-01-27 DIAGNOSIS — I47.1 SUPRAVENTRICULAR TACHYCARDIA: ICD-10-CM

## 2021-01-27 DIAGNOSIS — I49.1 ATRIAL PREMATURE DEPOLARIZATION: ICD-10-CM

## 2021-01-27 DIAGNOSIS — I70.0 ATHEROSCLEROSIS OF AORTA: ICD-10-CM

## 2021-01-27 DIAGNOSIS — Z98.84 BARIATRIC SURGERY STATUS: ICD-10-CM

## 2021-01-27 DIAGNOSIS — E66.9 OBESITY, UNSPECIFIED: ICD-10-CM

## 2021-01-27 DIAGNOSIS — Z88.5 ALLERGY STATUS TO NARCOTIC AGENT: ICD-10-CM

## 2021-04-05 PROBLEM — H35.30 UNSPECIFIED MACULAR DEGENERATION: Chronic | Status: ACTIVE | Noted: 2021-01-21

## 2021-04-05 PROBLEM — I10 ESSENTIAL (PRIMARY) HYPERTENSION: Chronic | Status: ACTIVE | Noted: 2021-01-21

## 2021-04-05 PROBLEM — E78.5 HYPERLIPIDEMIA, UNSPECIFIED: Chronic | Status: ACTIVE | Noted: 2021-01-21

## 2021-04-05 PROBLEM — K21.9 GASTRO-ESOPHAGEAL REFLUX DISEASE WITHOUT ESOPHAGITIS: Chronic | Status: ACTIVE | Noted: 2021-01-21

## 2021-04-05 PROBLEM — H40.9 UNSPECIFIED GLAUCOMA: Chronic | Status: ACTIVE | Noted: 2021-01-21

## 2021-04-19 ENCOUNTER — RESULT REVIEW (OUTPATIENT)
Age: 71
End: 2021-04-19

## 2021-04-19 ENCOUNTER — OUTPATIENT (OUTPATIENT)
Dept: OUTPATIENT SERVICES | Facility: HOSPITAL | Age: 71
LOS: 1 days | End: 2021-04-19
Payer: MEDICARE

## 2021-04-19 DIAGNOSIS — I48.91 UNSPECIFIED ATRIAL FIBRILLATION: ICD-10-CM

## 2021-04-19 DIAGNOSIS — I48.91 UNSPECIFIED ATRIAL FIBRILLATION: Chronic | ICD-10-CM

## 2021-04-19 DIAGNOSIS — Z98.84 BARIATRIC SURGERY STATUS: Chronic | ICD-10-CM

## 2021-04-19 DIAGNOSIS — Z98.890 OTHER SPECIFIED POSTPROCEDURAL STATES: Chronic | ICD-10-CM

## 2021-04-19 PROCEDURE — 75572 CT HRT W/3D IMAGE: CPT | Mod: 26,MH

## 2021-05-02 ENCOUNTER — APPOINTMENT (OUTPATIENT)
Dept: DISASTER EMERGENCY | Facility: CLINIC | Age: 71
End: 2021-05-02

## 2021-05-03 LAB — SARS-COV-2 N GENE NPH QL NAA+PROBE: NOT DETECTED

## 2021-05-04 ENCOUNTER — OUTPATIENT (OUTPATIENT)
Dept: OUTPATIENT SERVICES | Facility: HOSPITAL | Age: 71
LOS: 1 days | End: 2021-05-04
Payer: MEDICARE

## 2021-05-04 VITALS — DIASTOLIC BLOOD PRESSURE: 81 MMHG | SYSTOLIC BLOOD PRESSURE: 185 MMHG

## 2021-05-04 VITALS
HEIGHT: 64 IN | RESPIRATION RATE: 18 BRPM | WEIGHT: 184.09 LBS | TEMPERATURE: 98 F | SYSTOLIC BLOOD PRESSURE: 185 MMHG | OXYGEN SATURATION: 99 % | HEART RATE: 50 BPM | DIASTOLIC BLOOD PRESSURE: 81 MMHG

## 2021-05-04 DIAGNOSIS — Z98.890 OTHER SPECIFIED POSTPROCEDURAL STATES: Chronic | ICD-10-CM

## 2021-05-04 DIAGNOSIS — Z01.818 ENCOUNTER FOR OTHER PREPROCEDURAL EXAMINATION: ICD-10-CM

## 2021-05-04 DIAGNOSIS — Z90.49 ACQUIRED ABSENCE OF OTHER SPECIFIED PARTS OF DIGESTIVE TRACT: Chronic | ICD-10-CM

## 2021-05-04 DIAGNOSIS — Z98.84 BARIATRIC SURGERY STATUS: Chronic | ICD-10-CM

## 2021-05-04 DIAGNOSIS — I48.91 UNSPECIFIED ATRIAL FIBRILLATION: Chronic | ICD-10-CM

## 2021-05-04 LAB
ANION GAP SERPL CALC-SCNC: 8 MMOL/L — SIGNIFICANT CHANGE UP (ref 5–17)
APTT BLD: 40.9 SEC — HIGH (ref 27.5–35.5)
BLD GP AB SCN SERPL QL: SIGNIFICANT CHANGE UP
BUN SERPL-MCNC: 10 MG/DL — SIGNIFICANT CHANGE UP (ref 8–20)
CALCIUM SERPL-MCNC: 8.2 MG/DL — LOW (ref 8.6–10.2)
CHLORIDE SERPL-SCNC: 105 MMOL/L — SIGNIFICANT CHANGE UP (ref 98–107)
CO2 SERPL-SCNC: 24 MMOL/L — SIGNIFICANT CHANGE UP (ref 22–29)
CREAT SERPL-MCNC: 0.71 MG/DL — SIGNIFICANT CHANGE UP (ref 0.5–1.3)
GLUCOSE SERPL-MCNC: 90 MG/DL — SIGNIFICANT CHANGE UP (ref 70–99)
HCT VFR BLD CALC: 37.5 % — SIGNIFICANT CHANGE UP (ref 34.5–45)
HGB BLD-MCNC: 12.5 G/DL — SIGNIFICANT CHANGE UP (ref 11.5–15.5)
INR BLD: 1.64 RATIO — HIGH (ref 0.88–1.16)
MAGNESIUM SERPL-MCNC: 2.2 MG/DL — SIGNIFICANT CHANGE UP (ref 1.6–2.6)
MCHC RBC-ENTMCNC: 32.5 PG — SIGNIFICANT CHANGE UP (ref 27–34)
MCHC RBC-ENTMCNC: 33.3 GM/DL — SIGNIFICANT CHANGE UP (ref 32–36)
MCV RBC AUTO: 97.4 FL — SIGNIFICANT CHANGE UP (ref 80–100)
PLATELET # BLD AUTO: 211 K/UL — SIGNIFICANT CHANGE UP (ref 150–400)
POTASSIUM SERPL-MCNC: 4.6 MMOL/L — SIGNIFICANT CHANGE UP (ref 3.5–5.3)
POTASSIUM SERPL-SCNC: 4.6 MMOL/L — SIGNIFICANT CHANGE UP (ref 3.5–5.3)
PROTHROM AB SERPL-ACNC: 18.6 SEC — HIGH (ref 10.6–13.6)
RBC # BLD: 3.85 M/UL — SIGNIFICANT CHANGE UP (ref 3.8–5.2)
RBC # FLD: 15 % — HIGH (ref 10.3–14.5)
SODIUM SERPL-SCNC: 137 MMOL/L — SIGNIFICANT CHANGE UP (ref 135–145)
WBC # BLD: 5.18 K/UL — SIGNIFICANT CHANGE UP (ref 3.8–10.5)
WBC # FLD AUTO: 5.18 K/UL — SIGNIFICANT CHANGE UP (ref 3.8–10.5)

## 2021-05-04 PROCEDURE — 75572 CT HRT W/3D IMAGE: CPT

## 2021-05-04 PROCEDURE — 93010 ELECTROCARDIOGRAM REPORT: CPT

## 2021-05-04 RX ORDER — CHOLECALCIFEROL (VITAMIN D3) 125 MCG
0 CAPSULE ORAL
Qty: 0 | Refills: 0 | DISCHARGE

## 2021-05-04 RX ORDER — HYDROCHLOROTHIAZIDE 25 MG
12.5 TABLET ORAL ONCE
Refills: 0 | Status: COMPLETED | OUTPATIENT
Start: 2021-05-04 | End: 2021-05-04

## 2021-05-04 RX ADMIN — Medication 12.5 MILLIGRAM(S): at 11:54

## 2021-05-04 NOTE — H&P PST ADULT - HISTORY OF PRESENT ILLNESS
71 yo female with pmhx obesity s/p gastric bypass    , diverticulosis, HTN, persistent AF s/p LAZ/DCCV and Tikosyn initiation (    ), with breakthrough pAF who presents for PST for AF ablation 5/5/21.  LAZ deferred as cardiac CT scan 4/19/21 was negative for CHANCE thrombus, but did reveal a prox LAD lesion of 50-69%.      TTE 1/13/21: EF 60%, mild conc LVH, dilated LA 5.1cm, mild MR, mild TR  Cardiac PET 2/15/21: EF 73%, small non-reversible perfusion abnormality of mild severity in the apex regions on stress images 69 yo female with pmhx obesity s/p gastric bypass 2006, diverticulosis, HTN, persistent AF s/p LAZ/DCCV and Tikosyn initiation (1/2122-IP-[id number]), with breakthrough pAF who presents for PST for AF ablation 5/5/21.  LAZ deferred as cardiac CT scan 4/19/21 was negative for CHANCE thrombus, but did reveal a prox LAD lesion of 50-69%. Pt denies chest pain, pressure or SOB today.    TTE 1/13/21: EF 60%, mild conc LVH, dilated LA 5.1cm, mild MR, mild TR  Cardiac PET 2/15/21: EF 73%, small non-reversible perfusion abnormality of mild severity in the apex regions on stress images

## 2021-05-04 NOTE — H&P PST ADULT - NSICDXPASTSURGICALHX_GEN_ALL_CORE_FT
PAST SURGICAL HISTORY:  Atrial fibrillation status post cardioversion     H/O abdominoplasty     H/O vein stripping     History of breast lift     History of gastric bypass     History of ovarian cystectomy     S/P cholecystectomy

## 2021-05-04 NOTE — H&P PST ADULT - NSICDXPASTMEDICALHX_GEN_ALL_CORE_FT
PAST MEDICAL HISTORY:  Atrial fibrillation LAZ/DCCV          , Tikosyn, eliquis    Essential hypertension     GERD (gastroesophageal reflux disease)     Glaucoma     Hyperlipidemia, unspecified hyperlipidemia type     Macular degeneration

## 2021-05-04 NOTE — H&P PST ADULT - COMMENTS
denies recent cough, fever, chills, dysuria, hematuria, recent travel or COVID 19 infection  COVID VACCINE: denies recent cough, fever, chills, dysuria, hematuria, recent travel or COVID 19 infection  COVID VACCINE: Moderna, completed 4/7/21

## 2021-05-04 NOTE — H&P PST ADULT - NSANTHOSAYNRD_GEN_A_CORE
normal sleep study/No. SARINA screening performed.  STOP BANG Legend: 0-2 = LOW Risk; 3-4 = INTERMEDIATE Risk; 5-8 = HIGH Risk

## 2021-05-04 NOTE — H&P PST ADULT - ASSESSMENT
- COVID PCR negative 5/2/21, pt encouraged to continue to self-isolate for procedure  - npo after midnight  - hold morning Eliquis 5/5/21  - await call back from Dr Elise regarding abnormal Cardiac CTA revealing pLAD lesion of 50-69% 69 yo female with pmhx obesity s/p gastric bypass 2006, diverticulosis, HTN, persistent AF s/p LAZ/DCCV and Tikosyn initiation (1/2173-HA-Iuvbmo), with breakthrough pAF who presents for PST for AF ablation 5/5/21.  LAZ deferred as cardiac CT scan 4/19/21 was negative for CHANCE thrombus, but did reveal a prox LAD lesion of 50-69%. Pt denies chest pain, pressure or SOB today.    - COVID PCR negative 5/2/21, pt encouraged to continue to self-isolate for procedure  - npo after midnight  - hold morning Eliquis 5/5/21  - await call back from Dr Elise regarding abnormal Cardiac CTA revealing pLAD lesion of 50-69% 69 yo female with pmhx obesity s/p gastric bypass 2006, diverticulosis, HTN, persistent AF s/p LAZ/DCCV and Tikosyn initiation (1/2103-ZV-Adnsug), with breakthrough pAF who presents for PST for AF ablation 5/5/21.  LAZ deferred as cardiac CT scan 4/19/21 was negative for CHANCE thrombus, but did reveal a prox LAD lesion of 50-69%. Pt denies chest pain, pressure or SOB today.    - COVID PCR negative 5/2/21, pt encouraged to continue to self-isolate for procedure  - npo after midnight  - hold morning Eliquis 5/5/21  - await call back from Dr Elise regarding abnormal Cardiac CTA revealing pLAD lesion of 50-69% > reviewed with Dr Elise, pt is asymptomatic with recent PET scan as above, he recommends proceeding with procedure

## 2021-05-05 ENCOUNTER — TRANSCRIPTION ENCOUNTER (OUTPATIENT)
Age: 71
End: 2021-05-05

## 2021-05-05 ENCOUNTER — INPATIENT (INPATIENT)
Facility: HOSPITAL | Age: 71
LOS: 0 days | Discharge: HOME | DRG: 274 | End: 2021-05-06
Attending: INTERNAL MEDICINE | Admitting: INTERNAL MEDICINE
Payer: MEDICARE

## 2021-05-05 VITALS
TEMPERATURE: 98 F | RESPIRATION RATE: 18 BRPM | OXYGEN SATURATION: 96 % | SYSTOLIC BLOOD PRESSURE: 175 MMHG | WEIGHT: 184.09 LBS | DIASTOLIC BLOOD PRESSURE: 79 MMHG | HEART RATE: 64 BPM | HEIGHT: 64 IN

## 2021-05-05 DIAGNOSIS — I48.91 UNSPECIFIED ATRIAL FIBRILLATION: Chronic | ICD-10-CM

## 2021-05-05 DIAGNOSIS — Z90.49 ACQUIRED ABSENCE OF OTHER SPECIFIED PARTS OF DIGESTIVE TRACT: Chronic | ICD-10-CM

## 2021-05-05 DIAGNOSIS — I48.91 UNSPECIFIED ATRIAL FIBRILLATION: ICD-10-CM

## 2021-05-05 DIAGNOSIS — Z98.84 BARIATRIC SURGERY STATUS: Chronic | ICD-10-CM

## 2021-05-05 DIAGNOSIS — Z98.890 OTHER SPECIFIED POSTPROCEDURAL STATES: Chronic | ICD-10-CM

## 2021-05-05 LAB — ABO RH CONFIRMATION: SIGNIFICANT CHANGE UP

## 2021-05-05 RX ORDER — CHOLECALCIFEROL (VITAMIN D3) 125 MCG
0 CAPSULE ORAL
Qty: 0 | Refills: 0 | DISCHARGE

## 2021-05-05 RX ORDER — METOPROLOL TARTRATE 50 MG
150 TABLET ORAL DAILY
Refills: 0 | Status: DISCONTINUED | OUTPATIENT
Start: 2021-05-05 | End: 2021-05-06

## 2021-05-05 RX ORDER — VALSARTAN 80 MG/1
160 TABLET ORAL DAILY
Refills: 0 | Status: DISCONTINUED | OUTPATIENT
Start: 2021-05-05 | End: 2021-05-06

## 2021-05-05 RX ORDER — PREGABALIN 225 MG/1
1 CAPSULE ORAL
Qty: 0 | Refills: 0 | DISCHARGE

## 2021-05-05 RX ORDER — ZOLPIDEM TARTRATE 10 MG/1
1 TABLET ORAL
Qty: 0 | Refills: 0 | DISCHARGE

## 2021-05-05 RX ORDER — ASCORBIC ACID 60 MG
0 TABLET,CHEWABLE ORAL
Qty: 0 | Refills: 0 | DISCHARGE

## 2021-05-05 RX ORDER — APIXABAN 2.5 MG/1
5 TABLET, FILM COATED ORAL
Refills: 0 | Status: DISCONTINUED | OUTPATIENT
Start: 2021-05-05 | End: 2021-05-06

## 2021-05-05 RX ORDER — PREGABALIN 225 MG/1
1000 CAPSULE ORAL DAILY
Refills: 0 | Status: DISCONTINUED | OUTPATIENT
Start: 2021-05-05 | End: 2021-05-06

## 2021-05-05 RX ORDER — SUCRALFATE 1 G
1 TABLET ORAL
Refills: 0 | Status: DISCONTINUED | OUTPATIENT
Start: 2021-05-05 | End: 2021-05-06

## 2021-05-05 RX ORDER — OXYCODONE AND ACETAMINOPHEN 5; 325 MG/1; MG/1
1 TABLET ORAL EVERY 6 HOURS
Refills: 0 | Status: DISCONTINUED | OUTPATIENT
Start: 2021-05-05 | End: 2021-05-06

## 2021-05-05 RX ORDER — FOLIC ACID 0.8 MG
1 TABLET ORAL DAILY
Refills: 0 | Status: DISCONTINUED | OUTPATIENT
Start: 2021-05-05 | End: 2021-05-06

## 2021-05-05 RX ORDER — ALBUTEROL 90 UG/1
2 AEROSOL, METERED ORAL EVERY 6 HOURS
Refills: 0 | Status: DISCONTINUED | OUTPATIENT
Start: 2021-05-05 | End: 2021-05-06

## 2021-05-05 RX ORDER — BUDESONIDE AND FORMOTEROL FUMARATE DIHYDRATE 160; 4.5 UG/1; UG/1
2 AEROSOL RESPIRATORY (INHALATION)
Qty: 0 | Refills: 0 | DISCHARGE

## 2021-05-05 RX ORDER — BUDESONIDE AND FORMOTEROL FUMARATE DIHYDRATE 160; 4.5 UG/1; UG/1
2 AEROSOL RESPIRATORY (INHALATION)
Refills: 0 | Status: DISCONTINUED | OUTPATIENT
Start: 2021-05-05 | End: 2021-05-06

## 2021-05-05 RX ORDER — ASPIRIN/CALCIUM CARB/MAGNESIUM 324 MG
81 TABLET ORAL DAILY
Refills: 0 | Status: DISCONTINUED | OUTPATIENT
Start: 2021-05-05 | End: 2021-05-06

## 2021-05-05 RX ORDER — MULTIVIT WITH MIN/MFOLATE/K2 340-15/3 G
2 POWDER (GRAM) ORAL
Qty: 0 | Refills: 0 | DISCHARGE

## 2021-05-05 RX ORDER — FOLIC ACID 0.8 MG
1 TABLET ORAL
Qty: 0 | Refills: 0 | DISCHARGE

## 2021-05-05 RX ORDER — ZINC SULFATE TAB 220 MG (50 MG ZINC EQUIVALENT) 220 (50 ZN) MG
1 TAB ORAL
Qty: 0 | Refills: 0 | DISCHARGE

## 2021-05-05 RX ORDER — ASCORBIC ACID 60 MG
500 TABLET,CHEWABLE ORAL DAILY
Refills: 0 | Status: DISCONTINUED | OUTPATIENT
Start: 2021-05-05 | End: 2021-05-06

## 2021-05-05 RX ORDER — DENOSUMAB 60 MG/ML
0 INJECTION SUBCUTANEOUS
Qty: 0 | Refills: 0 | DISCHARGE

## 2021-05-05 RX ORDER — METOPROLOL TARTRATE 50 MG
150 TABLET ORAL DAILY
Refills: 0 | Status: DISCONTINUED | OUTPATIENT
Start: 2021-05-05 | End: 2021-05-05

## 2021-05-05 RX ORDER — CHOLECALCIFEROL (VITAMIN D3) 125 MCG
1000 CAPSULE ORAL DAILY
Refills: 0 | Status: DISCONTINUED | OUTPATIENT
Start: 2021-05-05 | End: 2021-05-06

## 2021-05-05 RX ORDER — BENZOYL PEROXIDE MICRONIZED 5.8 %
65 TOWELETTE (EA) TOPICAL
Qty: 0 | Refills: 0 | DISCHARGE

## 2021-05-05 RX ORDER — ZOLPIDEM TARTRATE 10 MG/1
5 TABLET ORAL AT BEDTIME
Refills: 0 | Status: DISCONTINUED | OUTPATIENT
Start: 2021-05-05 | End: 2021-05-06

## 2021-05-05 RX ORDER — PANTOPRAZOLE SODIUM 20 MG/1
40 TABLET, DELAYED RELEASE ORAL
Refills: 0 | Status: DISCONTINUED | OUTPATIENT
Start: 2021-05-05 | End: 2021-05-06

## 2021-05-05 RX ORDER — COLCHICINE 0.6 MG
0.6 TABLET ORAL DAILY
Refills: 0 | Status: DISCONTINUED | OUTPATIENT
Start: 2021-05-05 | End: 2021-05-06

## 2021-05-05 RX ORDER — LATANOPROST 0.05 MG/ML
1 SOLUTION/ DROPS OPHTHALMIC; TOPICAL AT BEDTIME
Refills: 0 | Status: DISCONTINUED | OUTPATIENT
Start: 2021-05-05 | End: 2021-05-06

## 2021-05-05 RX ORDER — ONDANSETRON 8 MG/1
4 TABLET, FILM COATED ORAL EVERY 8 HOURS
Refills: 0 | Status: DISCONTINUED | OUTPATIENT
Start: 2021-05-05 | End: 2021-05-06

## 2021-05-05 RX ORDER — ALBUTEROL 90 UG/1
90 AEROSOL, METERED ORAL
Qty: 0 | Refills: 0 | DISCHARGE

## 2021-05-05 RX ORDER — ALPRAZOLAM 0.25 MG
0.25 TABLET ORAL EVERY 6 HOURS
Refills: 0 | Status: DISCONTINUED | OUTPATIENT
Start: 2021-05-05 | End: 2021-05-06

## 2021-05-05 RX ORDER — ALBUTEROL 90 UG/1
2 AEROSOL, METERED ORAL
Qty: 0 | Refills: 0 | DISCHARGE

## 2021-05-05 RX ORDER — APIXABAN 2.5 MG/1
1 TABLET, FILM COATED ORAL
Qty: 0 | Refills: 0 | DISCHARGE

## 2021-05-05 RX ORDER — DOFETILIDE 0.25 MG/1
250 CAPSULE ORAL EVERY 12 HOURS
Refills: 0 | Status: DISCONTINUED | OUTPATIENT
Start: 2021-05-05 | End: 2021-05-06

## 2021-05-05 RX ORDER — BENZOCAINE AND MENTHOL 5; 1 G/100ML; G/100ML
1 LIQUID ORAL
Refills: 0 | Status: DISCONTINUED | OUTPATIENT
Start: 2021-05-05 | End: 2021-05-06

## 2021-05-05 RX ORDER — LATANOPROST 0.05 MG/ML
1 SOLUTION/ DROPS OPHTHALMIC; TOPICAL
Qty: 0 | Refills: 0 | DISCHARGE

## 2021-05-05 RX ORDER — VALSARTAN 80 MG/1
1 TABLET ORAL
Qty: 0 | Refills: 0 | DISCHARGE

## 2021-05-05 RX ORDER — ATORVASTATIN CALCIUM 80 MG/1
40 TABLET, FILM COATED ORAL AT BEDTIME
Refills: 0 | Status: DISCONTINUED | OUTPATIENT
Start: 2021-05-05 | End: 2021-05-06

## 2021-05-05 RX ADMIN — APIXABAN 5 MILLIGRAM(S): 2.5 TABLET, FILM COATED ORAL at 21:00

## 2021-05-05 RX ADMIN — ZOLPIDEM TARTRATE 5 MILLIGRAM(S): 10 TABLET ORAL at 22:27

## 2021-05-05 RX ADMIN — Medication 0.6 MILLIGRAM(S): at 21:00

## 2021-05-05 RX ADMIN — ONDANSETRON 4 MILLIGRAM(S): 8 TABLET, FILM COATED ORAL at 18:43

## 2021-05-05 RX ADMIN — ATORVASTATIN CALCIUM 40 MILLIGRAM(S): 80 TABLET, FILM COATED ORAL at 21:03

## 2021-05-05 RX ADMIN — Medication 1 GRAM(S): at 21:00

## 2021-05-05 RX ADMIN — LATANOPROST 1 DROP(S): 0.05 SOLUTION/ DROPS OPHTHALMIC; TOPICAL at 21:01

## 2021-05-05 RX ADMIN — PANTOPRAZOLE SODIUM 40 MILLIGRAM(S): 20 TABLET, DELAYED RELEASE ORAL at 21:00

## 2021-05-05 RX ADMIN — BENZOCAINE AND MENTHOL 1 LOZENGE: 5; 1 LIQUID ORAL at 14:20

## 2021-05-05 RX ADMIN — Medication 81 MILLIGRAM(S): at 21:00

## 2021-05-05 RX ADMIN — DOFETILIDE 250 MICROGRAM(S): 0.25 CAPSULE ORAL at 21:00

## 2021-05-05 NOTE — PROGRESS NOTE ADULT - SUBJECTIVE AND OBJECTIVE BOX
Pt presents this morning for elective RF AF ablation.  Denies complaint this morning.  PST completed 5/4/21, labs reviewed.    Last dose Eliquis 5/4/21 PM, pt confirms NPO > 8 hrs.                           12.5   5.18  )-----------( 211      ( 04 May 2021 10:45 )             37.5   05-04    137  |  105  |  10.0  ----------------------------<  90  4.6   |  24.0  |  0.71    Ca    8.2<L>      04 May 2021 10:45  Mg     2.2     05-04    - keep NPO  - iv insert  - confirmatory type and screen  - consent w/Dr. Elise

## 2021-05-05 NOTE — DISCHARGE NOTE PROVIDER - NSDCMRMEDTOKEN_GEN_ALL_CORE_FT
albuterol 90 mcg/inh inhalation aerosol: 2 puff(s) inhaled every 6 hours, As Needed  Calcium 500+D oral tablet, chewable: 1 tab(s) orally 2 times a day  dofetilide 250 mcg oral capsule: 1 cap(s) orally every 12 hours  elderberry: 1 cap(s) orally once a day -2 tabs daily  Eliquis 5 mg oral tablet: 1 tab(s) orally 2 times a day  folic acid: 1 tab(s) orally once a day  Iron-150: 65 milligram(s) orally once a day  latanoprost 0.005% ophthalmic solution: 1 drop(s) to each affected eye once a day (in the evening)  magnesium citrate: 2mg  Metoprolol Succinate ER: 150 milligram(s) orally once a day  pantoprazole 40 mg oral delayed release tablet: 1 tab(s) orally once a day  Prolia 60 mg/mL subcutaneous solution: subcutaneous every 6 months  Symbicort 160 mcg-4.5 mcg/inh inhalation aerosol: 2 puff(s) inhaled 2 times a day, As Needed  valsartan 160 mg oral tablet: 1 tab(s) orally once a day  Vitamin B12: 1 tab(s) orally once a day  Vitamin C 500 mg oral tablet: orally 2 times a day  Vitamin D3 5000 intl units (125 mcg) oral capsule: orally once a day  Zinc: 1 tab(s) orally once a day  zolpidem 5 mg oral tablet: 1 tab(s) orally once a day (at bedtime), As Needed   albuterol 90 mcg/inh inhalation aerosol: 2 puff(s) inhaled every 6 hours, As Needed  aspirin 81 mg oral delayed release tablet: 1 tab(s) orally once a day  atorvastatin 40 mg oral tablet: 1 tab(s) orally once a day (at bedtime)  Calcium 500+D oral tablet, chewable: 1 tab(s) orally 2 times a day  colchicine 0.6 mg oral tablet: 1 tab(s) orally once a day x 30 days then stop. If diarrhea occurs half dose daily  dofetilide 250 mcg oral capsule: 1 cap(s) orally every 12 hours  elderberry: 1 cap(s) orally once a day -2 tabs daily  Eliquis 5 mg oral tablet: 1 tab(s) orally 2 times a day  folic acid: 1 tab(s) orally once a day  Iron-150: 65 milligram(s) orally once a day  latanoprost 0.005% ophthalmic solution: 1 drop(s) to each affected eye once a day (in the evening)  magnesium citrate: 2mg  metoprolol succinate 25 mg oral tablet, extended release: 1 tab(s) orally once a day  pantoprazole 40 mg oral delayed release tablet: 1 tab(s) orally 2 times a day x 14 days the resume usual daily dosing   Prolia 60 mg/mL subcutaneous solution: subcutaneous every 6 months  sucralfate 1 g/10 mL oral suspension: 10 milliliter(s) orally 2 times a day x 14 days then stop  Symbicort 160 mcg-4.5 mcg/inh inhalation aerosol: 2 puff(s) inhaled 2 times a day, As Needed  valsartan 160 mg oral tablet: 1 tab(s) orally once a day  Vitamin B12: 1 tab(s) orally once a day  Vitamin C 500 mg oral tablet: orally 2 times a day  Vitamin D3 5000 intl units (125 mcg) oral capsule: orally once a day  Zinc: 1 tab(s) orally once a day  zolpidem 5 mg oral tablet: 1 tab(s) orally once a day (at bedtime), As Needed

## 2021-05-05 NOTE — PROGRESS NOTE ADULT - ASSESSMENT
Assessment:   69 yo female with pmhx obesity s/p gastric bypass 2006, diverticulosis, HTN, persistent AF s/p LAZ/DCCV and Tikosyn initiation (1/2187-QS-[id number]), with breakthrough pAF.  She presented electively on 5/5/21 and is now status post uncomplicated radiofrequency ablation of atrial fibrillation ([facility] PVI, LA posterior wall) via b/l femoral vein.  LAZ deferred as cardiac CT scan 4/19/21 was negative for CHANCE thrombus, but did reveal a prox LAD lesion of 50-69%.      Plan:   Bedrest x 4 hours, then OOB with assistance and progress as tolerated.   Groin sutures to be removed by EP service in AM.   Radial art line to be removed once pt fully awake with stable vitals >1 hour.    Pending groin status: Eliquis 5mg PO BID @ 17:00  Continue tikosyn 250mcg Q12 hrs.   Start aspirin 81mg PO daily.   Start Crestor 10mg PO daily.   Increase Protonix 40mg BID x 2 weeks then resume once a day home dose.      Start Carafate 1gm BID x 2 weeks.   Start colchicine 0.6mg PI daily X 30 days, as tolerated.   Continue other home medications.   Strict I/Os.  Please encourage incentive spirometry and ambulation once able.  Observation and monitoring on telemetry overnight with anticipated discharge in the AM and outpt follow up in 1 month.

## 2021-05-05 NOTE — DISCHARGE NOTE PROVIDER - CARE PROVIDER_API CALL
Joseph Elise)  Cardiac Electrophysiology; Cardiovascular Disease; Internal Medicine  [address] [place] [address]  Phone: [phone]  Fax: [phone]  Follow Up Time:

## 2021-05-05 NOTE — PROGRESS NOTE ADULT - SUBJECTIVE AND OBJECTIVE BOX
PROCEDURE(S): Radiofrequency Ablation of Atrial Fibrillation    ELECTROPHYSIOLOGIST(S): Joseph Elise MD         COMPLICATIONS:  none        DISPOSITION: observation      CONDITION: stable    Pt doing well s/p elective radiofrequency atrial fibrillation ablation ([facility] PVI, LA posterior wall) via b/l femoral vein access, vascade closure for hemostasis.  Pt denies complaint post procedure.     MEDICATIONS  (STANDING):  apixaban 5 milliGRAM(s) Oral two times a day  ascorbic acid 500 milliGRAM(s) Oral daily  aspirin enteric coated 81 milliGRAM(s) Oral daily  atorvastatin 40 milliGRAM(s) Oral at bedtime  budesonide 160 MICROgram(s)/formoterol 4.5 MICROgram(s) Inhaler 2 Puff(s) Inhalation two times a day  cholecalciferol 1000 Unit(s) Oral daily  colchicine 0.6 milliGRAM(s) Oral daily  cyanocobalamin 1000 MICROGram(s) Oral daily  dofetilide 250 MICROGram(s) Oral every 12 hours  folic acid 1 milliGRAM(s) Oral daily  latanoprost 0.005% Ophthalmic Solution 1 Drop(s) Both EYES at bedtime  metoprolol tartrate Suspension 150 milliGRAM(s) Oral daily  pantoprazole    Tablet 40 milliGRAM(s) Oral two times a day  sucralfate suspension 1 Gram(s) Oral two times a day  valsartan 160 milliGRAM(s) Oral daily    MEDICATIONS  (PRN):  ALBUTerol    90 MICROgram(s) HFA Inhaler 2 Puff(s) Inhalation every 6 hours PRN Shortness of Breath and/or Wheezing  ALPRAZolam 0.25 milliGRAM(s) Oral every 6 hours PRN anxiety/insomnia  benzocaine 15 mG/menthol 3.6 mG (Sugar-Free) Lozenge 1 Lozenge Oral every 2 hours PRN Sore Throat  oxycodone    5 mG/acetaminophen 325 mG 1 Tablet(s) Oral every 6 hours PRN Severe Pain (7 - 10)  zolpidem 5 milliGRAM(s) Oral at bedtime PRN Insomnia    Allergies:  fentanyl (Other)  narcotic analgesics (Other)    Exam:   T(C): 36.7 (05-05-21 @ 07:56), Max: 36.7 (05-05-21 @ 07:56)  HR: 56 (05-05-21 @ 13:00) (56 - 64)  BP: 148/75 (05-05-21 @ 13:00) (140/75 - 175/79)  RR: 18 (05-05-21 @ 13:00) (18 - 18)  SpO2: 98% (05-05-21 @ 13:00) (96% - 98%)  Post-procedure VS: /76 HR 60 O2 sat 98% RR 16     Physical exam:   awake, no obvious distress  Card: S1/S2, RRR, no m/g/r  Resp: lungs CTA b/l  Abd: S/NT/ND  Groins: sites C/D/I; no bleeding, hematoma, erythema, exudate or edema  Ext: no edema; distal pulses intact    ECG: sinus rhythm 61 bpm, intact conduction     Assessment:   71 yo female with pmhx obesity s/p gastric bypass 2006, diverticulosis, HTN, persistent AF s/p LAZ/DCCV and Tikosyn initiation (1/2186-XI-[id number]), with breakthrough pAF.  She presented electively on 5/5/21 and is now status post uncomplicated radiofrequency ablation of atrial fibrillation ([facility] PVI, LA posterior wall) via b/l femoral vein.  LAZ deferred as cardiac CT scan 4/19/21 was negative for CHANCE thrombus, but did reveal a prox LAD lesion of 50-69%.      Plan:   Bedrest x 4 hours, then OOB with assistance and progress as tolerated.   Groin sutures to be removed by EP service in AM.   Radial art line to be removed once pt fully awake with stable vitals >1 hour.    Pending groin status: Eliquis 5mg PO BID @ 17:00  Continue tikosyn 250mcg Q12 hrs.   Start aspirin 81mg PO daily.   Start Crestor 10mg PO daily.   Increase Protonix 40mg BID x 2 weeks then resume once a day home dose.      Start Carafate 1gm BID x 2 weeks.   Start colchicine 0.6mg PI daily X 30 days, as tolerated.   Continue other home medications.   Strict I/Os.  Please encourage incentive spirometry and ambulation once able.  Observation and monitoring on telemetry overnight with anticipated discharge in the AM and outpt follow up in 1 month.   PROCEDURE(S): Radiofrequency Ablation of Atrial Fibrillation    ELECTROPHYSIOLOGIST(S): Joseph Elise MD         COMPLICATIONS:  none        DISPOSITION: observation      CONDITION: stable    Pt doing well s/p elective radiofrequency atrial fibrillation ablation ([facility] PVI, LA posterior wall) via b/l femoral vein access, vascade closure for hemostasis.  Pt denies complaint post procedure.     MEDICATIONS  (STANDING):  apixaban 5 milliGRAM(s) Oral two times a day  ascorbic acid 500 milliGRAM(s) Oral daily  aspirin enteric coated 81 milliGRAM(s) Oral daily  atorvastatin 40 milliGRAM(s) Oral at bedtime  budesonide 160 MICROgram(s)/formoterol 4.5 MICROgram(s) Inhaler 2 Puff(s) Inhalation two times a day  cholecalciferol 1000 Unit(s) Oral daily  colchicine 0.6 milliGRAM(s) Oral daily  cyanocobalamin 1000 MICROGram(s) Oral daily  dofetilide 250 MICROGram(s) Oral every 12 hours  folic acid 1 milliGRAM(s) Oral daily  latanoprost 0.005% Ophthalmic Solution 1 Drop(s) Both EYES at bedtime  metoprolol tartrate Suspension 150 milliGRAM(s) Oral daily  pantoprazole    Tablet 40 milliGRAM(s) Oral two times a day  sucralfate suspension 1 Gram(s) Oral two times a day  valsartan 160 milliGRAM(s) Oral daily    MEDICATIONS  (PRN):  ALBUTerol    90 MICROgram(s) HFA Inhaler 2 Puff(s) Inhalation every 6 hours PRN Shortness of Breath and/or Wheezing  ALPRAZolam 0.25 milliGRAM(s) Oral every 6 hours PRN anxiety/insomnia  benzocaine 15 mG/menthol 3.6 mG (Sugar-Free) Lozenge 1 Lozenge Oral every 2 hours PRN Sore Throat  oxycodone    5 mG/acetaminophen 325 mG 1 Tablet(s) Oral every 6 hours PRN Severe Pain (7 - 10)  zolpidem 5 milliGRAM(s) Oral at bedtime PRN Insomnia    Allergies:  fentanyl (Other)  narcotic analgesics (Other)    Exam:   T(C): 36.7 (05-05-21 @ 07:56), Max: 36.7 (05-05-21 @ 07:56)  HR: 56 (05-05-21 @ 13:00) (56 - 64)  BP: 148/75 (05-05-21 @ 13:00) (140/75 - 175/79)  RR: 18 (05-05-21 @ 13:00) (18 - 18)  SpO2: 98% (05-05-21 @ 13:00) (96% - 98%)  Post-procedure VS: /76 HR 60 O2 sat 98% RR 16     Physical exam:   awake, no obvious distress  Card: S1/S2, RRR, no m/g/r  Resp: lungs CTA b/l  Abd: S/NT/ND  Groins: sites C/D/I; no bleeding, hematoma, erythema, exudate or edema  Ext: no edema; distal pulses intact    ECG: sinus rhythm 61 bpm, intact conduction

## 2021-05-05 NOTE — DISCHARGE NOTE PROVIDER - NSDCCPCAREPLAN_GEN_ALL_CORE_FT
PRINCIPAL DISCHARGE DIAGNOSIS  Diagnosis: Atrial fibrillation  Assessment and Plan of Treatment:       SECONDARY DISCHARGE DIAGNOSES  Diagnosis: CAD (coronary artery disease)  Assessment and Plan of Treatment:

## 2021-05-05 NOTE — DISCHARGE NOTE PROVIDER - HOSPITAL COURSE
71 yo female with pmhx obesity s/p gastric bypass 2006, diverticulosis, HTN, persistent AF s/p LAZ/DCCV and Tikosyn initiation (1/2114-FZ-Awkxmt), with breakthrough pAF.  She presented electively on 5/5/21 and is now status post uncomplicated radiofrequency ablation of atrial fibrillation ([facility] PVI, LA posterior wall) via b/l femoral vein.  LAZ deferred as cardiac CT scan 4/19/21 was negative for CHANCE thrombus, but did reveal a prox LAD lesion of 50-69%.      Plan:   Eliquis 5mg PO twice a day.   Continue tikosyn 250mcg Q12 hrs.   Start aspirin 81mg PO daily.   Start Crestor 10mg PO daily.   Increase Protonix 40mg BID x 2 weeks then resume once a day home dose.      Start Carafate 1gm BID x 2 weeks.   Start colchicine 0.6mg PI daily X 30 days, as tolerated.   Continue other home medications. 71 yo female with pmhx obesity s/p gastric bypass 2006, diverticulosis, HTN, persistent AF s/p LAZ/DCCV and Tikosyn initiation (1/2118-QC-[id number]), with breakthrough pAF.  She presented electively on 5/5/21 and is now POD #1 status post uncomplicated radiofrequency ablation of atrial fibrillation ([facility] PVI, LA posterior wall) via b/l femoral vein.  LAZ deferred as cardiac CT scan 4/19/21 was negative for CHANCE thrombus, but did reveal a prox LAD lesion of 50-69%.       Plan:   Continue tikosyn 250mcg Q12 hrs.   Start aspirin 81mg PO daily.   Start Crestor 10mg PO daily.   Increase Protonix 40mg BID x 2 weeks then resume once a day home dose.      Start Carafate 1gm BID x 2 weeks.   Start colchicine 0.6mg daily X 30 days, as tolerated.   Will decrease Metoprolol to 25mg daily secondary to bradycardia  Above d/w Dr Elise  Access site care and activity limitations reviewed w/ pt.   Outpt f/up in 2-4 weeks.

## 2021-05-05 NOTE — DISCHARGE NOTE PROVIDER - NSDCFUADDINST_GEN_ALL_CORE_FT
Follow up with Dr. Elise in 3-4 weeks.  Please call the office to schedule an appointment.    Follow up with Dr. Elise in 3-4 weeks.  Please call the office to schedule an appointment.   Continue tikosyn 250mcg Q12 hrs.   Start aspirin 81mg PO daily.   Start Crestor 10mg PO daily.   Increase Protonix 40mg BID x 2 weeks then resume usual daily dosing.   Start Carafate 1gm BID x 2 weeks.   Start colchicine 0.6mg daily X 30 days, as tolerated.   Decrease Metoprolol to 25mg daily

## 2021-05-06 ENCOUNTER — TRANSCRIPTION ENCOUNTER (OUTPATIENT)
Age: 71
End: 2021-05-06

## 2021-05-06 VITALS
OXYGEN SATURATION: 96 % | SYSTOLIC BLOOD PRESSURE: 105 MMHG | DIASTOLIC BLOOD PRESSURE: 65 MMHG | RESPIRATION RATE: 16 BRPM | TEMPERATURE: 98 F | HEART RATE: 56 BPM

## 2021-05-06 LAB
ANION GAP SERPL CALC-SCNC: 9 MMOL/L — SIGNIFICANT CHANGE UP (ref 5–17)
BUN SERPL-MCNC: 11 MG/DL — SIGNIFICANT CHANGE UP (ref 8–20)
CALCIUM SERPL-MCNC: 7.4 MG/DL — LOW (ref 8.6–10.2)
CHLORIDE SERPL-SCNC: 104 MMOL/L — SIGNIFICANT CHANGE UP (ref 98–107)
CO2 SERPL-SCNC: 23 MMOL/L — SIGNIFICANT CHANGE UP (ref 22–29)
CREAT SERPL-MCNC: 0.64 MG/DL — SIGNIFICANT CHANGE UP (ref 0.5–1.3)
GLUCOSE SERPL-MCNC: 96 MG/DL — SIGNIFICANT CHANGE UP (ref 70–99)
HCT VFR BLD CALC: 33.2 % — LOW (ref 34.5–45)
HGB BLD-MCNC: 11.2 G/DL — LOW (ref 11.5–15.5)
MAGNESIUM SERPL-MCNC: 2 MG/DL — SIGNIFICANT CHANGE UP (ref 1.6–2.6)
MCHC RBC-ENTMCNC: 32.9 PG — SIGNIFICANT CHANGE UP (ref 27–34)
MCHC RBC-ENTMCNC: 33.7 GM/DL — SIGNIFICANT CHANGE UP (ref 32–36)
MCV RBC AUTO: 97.6 FL — SIGNIFICANT CHANGE UP (ref 80–100)
PLATELET # BLD AUTO: 196 K/UL — SIGNIFICANT CHANGE UP (ref 150–400)
POTASSIUM SERPL-MCNC: 4.1 MMOL/L — SIGNIFICANT CHANGE UP (ref 3.5–5.3)
POTASSIUM SERPL-SCNC: 4.1 MMOL/L — SIGNIFICANT CHANGE UP (ref 3.5–5.3)
RBC # BLD: 3.4 M/UL — LOW (ref 3.8–5.2)
RBC # FLD: 15.1 % — HIGH (ref 10.3–14.5)
SODIUM SERPL-SCNC: 136 MMOL/L — SIGNIFICANT CHANGE UP (ref 135–145)
WBC # BLD: 8.98 K/UL — SIGNIFICANT CHANGE UP (ref 3.8–10.5)
WBC # FLD AUTO: 8.98 K/UL — SIGNIFICANT CHANGE UP (ref 3.8–10.5)

## 2021-05-06 PROCEDURE — 93657 TX L/R ATRIAL FIB ADDL: CPT

## 2021-05-06 PROCEDURE — 93656 COMPRE EP EVAL ABLTJ ATR FIB: CPT

## 2021-05-06 PROCEDURE — C1730: CPT

## 2021-05-06 PROCEDURE — C1894: CPT

## 2021-05-06 PROCEDURE — 93005 ELECTROCARDIOGRAM TRACING: CPT

## 2021-05-06 PROCEDURE — G0463: CPT

## 2021-05-06 PROCEDURE — C1889: CPT

## 2021-05-06 PROCEDURE — 93010 ELECTROCARDIOGRAM REPORT: CPT

## 2021-05-06 PROCEDURE — C1732: CPT

## 2021-05-06 PROCEDURE — 93613 INTRACARDIAC EPHYS 3D MAPG: CPT

## 2021-05-06 PROCEDURE — C1731: CPT

## 2021-05-06 PROCEDURE — C1766: CPT

## 2021-05-06 PROCEDURE — 80048 BASIC METABOLIC PNL TOTAL CA: CPT

## 2021-05-06 PROCEDURE — 85027 COMPLETE CBC AUTOMATED: CPT

## 2021-05-06 PROCEDURE — C1760: CPT

## 2021-05-06 PROCEDURE — C1759: CPT

## 2021-05-06 PROCEDURE — 36415 COLL VENOUS BLD VENIPUNCTURE: CPT

## 2021-05-06 PROCEDURE — 83735 ASSAY OF MAGNESIUM: CPT

## 2021-05-06 PROCEDURE — 93662 INTRACARDIAC ECG (ICE): CPT

## 2021-05-06 PROCEDURE — 93623 PRGRMD STIMJ&PACG IV RX NFS: CPT

## 2021-05-06 RX ORDER — COLCHICINE 0.6 MG
1 TABLET ORAL
Qty: 30 | Refills: 0
Start: 2021-05-06 | End: 2021-06-04

## 2021-05-06 RX ORDER — METOPROLOL TARTRATE 50 MG
1 TABLET ORAL
Qty: 30 | Refills: 3
Start: 2021-05-06 | End: 2021-09-02

## 2021-05-06 RX ORDER — METOPROLOL TARTRATE 50 MG
150 TABLET ORAL
Qty: 0 | Refills: 0 | DISCHARGE

## 2021-05-06 RX ORDER — PANTOPRAZOLE SODIUM 20 MG/1
1 TABLET, DELAYED RELEASE ORAL
Qty: 28 | Refills: 0
Start: 2021-05-06 | End: 2021-05-19

## 2021-05-06 RX ORDER — SUCRALFATE 1 G
10 TABLET ORAL
Qty: 300 | Refills: 0
Start: 2021-05-06 | End: 2021-05-19

## 2021-05-06 RX ORDER — ATORVASTATIN CALCIUM 80 MG/1
1 TABLET, FILM COATED ORAL
Qty: 30 | Refills: 3
Start: 2021-05-06 | End: 2021-09-02

## 2021-05-06 RX ORDER — PANTOPRAZOLE SODIUM 20 MG/1
1 TABLET, DELAYED RELEASE ORAL
Qty: 0 | Refills: 0 | DISCHARGE

## 2021-05-06 RX ORDER — ASPIRIN/CALCIUM CARB/MAGNESIUM 324 MG
1 TABLET ORAL
Qty: 30 | Refills: 3
Start: 2021-05-06 | End: 2021-09-02

## 2021-05-06 RX ADMIN — Medication 1 GRAM(S): at 05:20

## 2021-05-06 RX ADMIN — Medication 81 MILLIGRAM(S): at 08:28

## 2021-05-06 RX ADMIN — APIXABAN 5 MILLIGRAM(S): 2.5 TABLET, FILM COATED ORAL at 08:27

## 2021-05-06 RX ADMIN — PREGABALIN 1000 MICROGRAM(S): 225 CAPSULE ORAL at 08:27

## 2021-05-06 RX ADMIN — PANTOPRAZOLE SODIUM 40 MILLIGRAM(S): 20 TABLET, DELAYED RELEASE ORAL at 05:20

## 2021-05-06 RX ADMIN — Medication 1 MILLIGRAM(S): at 08:28

## 2021-05-06 RX ADMIN — Medication 0.6 MILLIGRAM(S): at 08:28

## 2021-05-06 RX ADMIN — Medication 1000 UNIT(S): at 08:28

## 2021-05-06 RX ADMIN — DOFETILIDE 250 MICROGRAM(S): 0.25 CAPSULE ORAL at 08:27

## 2021-05-06 RX ADMIN — Medication 150 MILLIGRAM(S): at 05:19

## 2021-05-06 RX ADMIN — Medication 500 MILLIGRAM(S): at 08:28

## 2021-05-06 RX ADMIN — BENZOCAINE AND MENTHOL 1 LOZENGE: 5; 1 LIQUID ORAL at 05:20

## 2021-05-06 RX ADMIN — VALSARTAN 160 MILLIGRAM(S): 80 TABLET ORAL at 05:20

## 2021-05-06 NOTE — PROGRESS NOTE ADULT - SUBJECTIVE AND OBJECTIVE BOX
Pt doing well POD #1 s/p elective radiofrequency atrial fibrillation ablation ([facility] PVI, LA posterior wall) via b/l femoral vein access, vascade closure for hemostasis.  Pt seen and examined this morning, reports feeling well, denies any complaints. Morning labs and telemetry reviewed. Telemetry notable for sinus bradycardia with APCs.    EKG: Sinus bradycardia at 51bpm w/ intact conduction and intervals. PRWP  TELE: Sinus bradycardia ~50bpm with frequent APCs    MEDICATIONS  (STANDING):  apixaban 5 milliGRAM(s) Oral two times a day  ascorbic acid 500 milliGRAM(s) Oral daily  aspirin enteric coated 81 milliGRAM(s) Oral daily  atorvastatin 40 milliGRAM(s) Oral at bedtime  budesonide 160 MICROgram(s)/formoterol 4.5 MICROgram(s) Inhaler 2 Puff(s) Inhalation two times a day  cholecalciferol 1000 Unit(s) Oral daily  colchicine 0.6 milliGRAM(s) Oral daily  cyanocobalamin 1000 MICROGram(s) Oral daily  dofetilide 250 MICROGram(s) Oral every 12 hours  folic acid 1 milliGRAM(s) Oral daily  latanoprost 0.005% Ophthalmic Solution 1 Drop(s) Both EYES at bedtime  metoprolol succinate  milliGRAM(s) Oral daily  pantoprazole    Tablet 40 milliGRAM(s) Oral two times a day  sucralfate suspension 1 Gram(s) Oral two times a day  valsartan 160 milliGRAM(s) Oral daily    MEDICATIONS  (PRN):  ALBUTerol    90 MICROgram(s) HFA Inhaler 2 Puff(s) Inhalation every 6 hours PRN Shortness of Breath and/or Wheezing  ALPRAZolam 0.25 milliGRAM(s) Oral every 6 hours PRN anxiety/insomnia  benzocaine 15 mG/menthol 3.6 mG (Sugar-Free) Lozenge 1 Lozenge Oral every 2 hours PRN Sore Throat  ondansetron Injectable 4 milliGRAM(s) IV Push every 8 hours PRN Nausea and/or Vomiting  oxycodone    5 mG/acetaminophen 325 mG 1 Tablet(s) Oral every 6 hours PRN Severe Pain (7 - 10)  zolpidem 5 milliGRAM(s) Oral at bedtime PRN Insomnia      Allergies  fentanyl (Other)  narcotic analgesics (Other)      PAST MEDICAL & SURGICAL HISTORY:  Essential hypertension  Hyperlipidemia, unspecified hyperlipidemia type  Glaucoma  Macular degeneration  GERD (gastroesophageal reflux disease)  Atrial fibrillation  LAZ/DCCV          , Tikosyn, eliquis  History of gastric bypass  H/O vein stripping  Atrial fibrillation status post cardioversion  History of ovarian cystectomy  H/O abdominoplasty  History of breast lift  S/P cholecystectomy      Vital Signs Last 24 Hrs  T(C): 36.6 (06 May 2021 05:05), Max: 36.7 (05 May 2021 07:56)  T(F): 97.8 (06 May 2021 05:05), Max: 98.1 (05 May 2021 07:56)  HR: 61 (06 May 2021 05:05) (48 - 64)  BP: 137/62 (06 May 2021 05:05) (116/66 - 187/77)  RR: 16 (06 May 2021 05:05) (16 - 18)  SpO2: 96% (06 May 2021 05:05) (94% - 100%)    Physical Exam:  Constitutional: NAD, AAOx3  Cardiovascular: +S1S2 RRR  Pulmonary: CTA b/l, unlabored  Abd: soft NTND +BS  Groins: C/D/I bilaterally; no bleeding, hematoma, edema  Extremities: no pedal edema, +distal pulses b/l  Neuro: non focal, SEBASTIAN x4    LABS:                        11.2   8.98  )-----------( 196      ( 06 May 2021 05:11 )             33.2     05-06    136  |  104  |  11.0  ----------------------------<  96  4.1   |  23.0  |  0.64    Ca    7.4<L>      06 May 2021 05:11  Mg     2.0     05-06      PT/INR - ( 04 May 2021 10:45 )   PT: 18.6 sec;   INR: 1.64 ratio         PTT - ( 04 May 2021 10:45 )  PTT:40.9 sec      Assessment:   71 yo female with pmhx obesity s/p gastric bypass 2006, diverticulosis, HTN, persistent AF s/p LAZ/DCCV and Tikosyn initiation (1/21-[facility]), with breakthrough pAF.  She presented electively on 5/5/21 and is now POD #1 status post uncomplicated radiofrequency ablation of atrial fibrillation ([facility] PVI, LA posterior wall) via b/l femoral vein.  LAZ deferred as cardiac CT scan 4/19/21 was negative for CHANCE thrombus, but did reveal a prox LAD lesion of 50-69%.       Plan:   Continue tikosyn 250mcg Q12 hrs.   Start aspirin 81mg PO daily.   Start Crestor 10mg PO daily.   Increase Protonix 40mg BID x 2 weeks then resume once a day home dose.      Start Carafate 1gm BID x 2 weeks.   Start colchicine 0.6mg daily X 30 days, as tolerated.   Will decrease Metoprolol to 25mg daily secondary to bradycardia  Above d/w Dr Elise  Access site care and activity limitations reviewed w/ pt.   Outpt f/up in 2-4 weeks.    Pt doing well POD #1 s/p elective radiofrequency atrial fibrillation ablation ([facility] PVI, LA posterior wall) via b/l femoral vein access, vascade closure for hemostasis.  Pt seen and examined this morning, reports feeling well, denies any complaints. Morning labs and telemetry reviewed. Telemetry notable for sinus bradycardia with APCs. Pt does report history of dizziness when first getting up from sitting to standing, no changes postprocedure. Denies CP, palp, SOB, N/V, syncope or presyncope.     EKG: Sinus bradycardia at 51bpm w/ intact conduction and intervals. PRWP  TELE: Sinus bradycardia ~50bpm with frequent APCs    MEDICATIONS  (STANDING):  apixaban 5 milliGRAM(s) Oral two times a day  ascorbic acid 500 milliGRAM(s) Oral daily  aspirin enteric coated 81 milliGRAM(s) Oral daily  atorvastatin 40 milliGRAM(s) Oral at bedtime  budesonide 160 MICROgram(s)/formoterol 4.5 MICROgram(s) Inhaler 2 Puff(s) Inhalation two times a day  cholecalciferol 1000 Unit(s) Oral daily  colchicine 0.6 milliGRAM(s) Oral daily  cyanocobalamin 1000 MICROGram(s) Oral daily  dofetilide 250 MICROGram(s) Oral every 12 hours  folic acid 1 milliGRAM(s) Oral daily  latanoprost 0.005% Ophthalmic Solution 1 Drop(s) Both EYES at bedtime  metoprolol succinate  milliGRAM(s) Oral daily  pantoprazole    Tablet 40 milliGRAM(s) Oral two times a day  sucralfate suspension 1 Gram(s) Oral two times a day  valsartan 160 milliGRAM(s) Oral daily    MEDICATIONS  (PRN):  ALBUTerol    90 MICROgram(s) HFA Inhaler 2 Puff(s) Inhalation every 6 hours PRN Shortness of Breath and/or Wheezing  ALPRAZolam 0.25 milliGRAM(s) Oral every 6 hours PRN anxiety/insomnia  benzocaine 15 mG/menthol 3.6 mG (Sugar-Free) Lozenge 1 Lozenge Oral every 2 hours PRN Sore Throat  ondansetron Injectable 4 milliGRAM(s) IV Push every 8 hours PRN Nausea and/or Vomiting  oxycodone    5 mG/acetaminophen 325 mG 1 Tablet(s) Oral every 6 hours PRN Severe Pain (7 - 10)  zolpidem 5 milliGRAM(s) Oral at bedtime PRN Insomnia      Allergies  fentanyl (Other)  narcotic analgesics (Other)      PAST MEDICAL & SURGICAL HISTORY:  Essential hypertension  Hyperlipidemia, unspecified hyperlipidemia type  Glaucoma  Macular degeneration  GERD (gastroesophageal reflux disease)  Atrial fibrillation  LAZ/DCCV          , Tikosyn, eliquis  History of gastric bypass  H/O vein stripping  Atrial fibrillation status post cardioversion  History of ovarian cystectomy  H/O abdominoplasty  History of breast lift  S/P cholecystectomy      Vital Signs Last 24 Hrs  T(C): 36.6 (06 May 2021 05:05), Max: 36.7 (05 May 2021 07:56)  T(F): 97.8 (06 May 2021 05:05), Max: 98.1 (05 May 2021 07:56)  HR: 61 (06 May 2021 05:05) (48 - 64)  BP: 137/62 (06 May 2021 05:05) (116/66 - 187/77)  RR: 16 (06 May 2021 05:05) (16 - 18)  SpO2: 96% (06 May 2021 05:05) (94% - 100%)    Physical Exam:  Constitutional: NAD, AAOx3  Cardiovascular: +S1S2 RRR  Pulmonary: CTA b/l, unlabored  Abd: soft NTND +BS  Groins: C/D/I bilaterally; no bleeding, hematoma, edema  Extremities: no pedal edema, +distal pulses b/l  Neuro: non focal, SEBASTIAN x4    LABS:                        11.2   8.98  )-----------( 196      ( 06 May 2021 05:11 )             33.2     05-06    136  |  104  |  11.0  ----------------------------<  96  4.1   |  23.0  |  0.64    Ca    7.4<L>      06 May 2021 05:11  Mg     2.0     05-06      PT/INR - ( 04 May 2021 10:45 )   PT: 18.6 sec;   INR: 1.64 ratio         PTT - ( 04 May 2021 10:45 )  PTT:40.9 sec      Assessment:   69 yo female with pmhx obesity s/p gastric bypass 2006, diverticulosis, HTN, persistent AF s/p LAZ/DCCV and Tikosyn initiation (1/2109-QG-G[facility]bae), with breakthrough pAF.  She presented electively on 5/5/21 and is now POD #1 status post uncomplicated radiofrequency ablation of atrial fibrillation ([facility] PVI, LA posterior wall) via b/l femoral vein.  LAZ deferred as cardiac CT scan 4/19/21 was negative for CHANCE thrombus, but did reveal a prox LAD lesion of 50-69%.       Plan:   Continue tikosyn 250mcg Q12 hrs.   Start aspirin 81mg PO daily.   Start Crestor 10mg PO daily.   Increase Protonix 40mg BID x 2 weeks then resume once a day home dose.      Start Carafate 1gm BID x 2 weeks.   Start colchicine 0.6mg daily X 30 days, as tolerated.   Will decrease Metoprolol to 25mg daily secondary to bradycardia  Above d/w Dr Elise  Access site care and activity limitations reviewed w/ pt.   Outpt f/up in 2-4 weeks.

## 2021-05-06 NOTE — DISCHARGE NOTE NURSING/CASE MANAGEMENT/SOCIAL WORK - PATIENT PORTAL LINK FT
You can access the FollowMyHealth Patient Portal offered by [facility] by registering at the following website: [email or web address]. By joining [employer]’s FollowMyHealth portal, you will also be able to view your health information using other applications (apps) compatible with our system.

## 2023-01-17 ENCOUNTER — RX RENEWAL (OUTPATIENT)
Age: 73
End: 2023-01-17

## 2023-01-17 DIAGNOSIS — I48.19 OTHER PERSISTENT ATRIAL FIBRILLATION: ICD-10-CM

## 2023-01-17 RX ORDER — DOFETILIDE 0.25 MG/1
250 CAPSULE ORAL
Qty: 180 | Refills: 2 | Status: ACTIVE | COMMUNITY
Start: 2023-01-17 | End: 1900-01-01

## 2023-03-01 ENCOUNTER — RX RENEWAL (OUTPATIENT)
Age: 73
End: 2023-03-01

## 2024-02-01 NOTE — DISCHARGE NOTE PROVIDER - NSDCHC_MEDRECSTATUS_GEN_ALL_CORE
[Access issues (e.g., transportation, impaired mobility, etc.)] : due to patient's access issues [Continuing, patient not seen in-person within last 12 months (provide details below)] : Telehealth services are continuing, patient not seen in-person within last 12 months.  [Telehealth (audio & video) - Individual/Group] : This visit was provided via telehealth using real-time 2-way audio visual technology. [Other Location: e.g. Home (Enter Location, City,State)___] : The provider was located at [unfilled]. [Home] : The patient, [unfilled], was located at home, [unfilled], at the time of the visit. [Verbal consent obtained from patient/other participant(s)] : Verbal consent for telehealth/telephonic services obtained from patient/other participant(s) [Patient] : Patient [FreeTextEntry4] : 10:00am Admission Reconciliation is Completed  Discharge Reconciliation is Not Complete [FreeTextEntry5] : 10::45am [FreeTextEntry1] : depression and anxiety Admission Reconciliation is Completed  Discharge Reconciliation is Completed

## 2024-10-04 NOTE — PROCEDURAL SAFETY CHECKLIST WITH OR WITHOUT SEDATION - NSPOSTCOMMENTFT_GEN_ALL_CORE
Hospitalist History and Physical   Admit Date:  10/3/2024 10:59 PM   Name:  Estefanía Hope   Age:  74 y.o.  Sex:  female  :  1949   MRN:  [id number]     Presenting Complaint: abdominal pain  Reason(s) for Admission: SBO (small bowel obstruction) (HCC) [K56.609]     History of Present Illness:   Estefanía Hope is a 74 y.o. female with medical history of HTN, hypothyroidism who presented to ED with abdominal pain.  Symptoms started earlier this morning.  Associated nausea/vomiting.  Upon ER evaluation, CT shows evidence of SBO with large hiatal hernia.  WBC is 19.07.  CMP was hemolyzed, so awaiting recollect.  Lactic acid elevated at 3.25.  Cr normal at 0.882.  General Surgery was consulted.  Hospitalist asked to admit.    Review of Systems:  10 systems reviewed and negative except as noted in HPI.  Assessment & Plan:   * SBO (small bowel obstruction) (HCC)  Assessment & Plan  - CT prelim read with evidence of SBO and large hiatal hernia  - NGT to LIS  - NPO except ice chips  - General Surgery consulted  - LR IVFs  - PRN morphine for pain    Leukocytosis  Assessment & Plan  - No reported fevers, but WBC is 19 and lactic acid is also elevated  - Will obtain blood cultures  - Have ordered UA with reflex to culture  - Start IVFs.  Recheck CBC in AM  - Defer antibiotics for now.  If patient spikes fever, will start antibiotics for presumptive infection at that time    Hypothyroidism  Assessment & Plan  - Holding home synthroid for now due to NGT/NPO    HTN (hypertension)  Assessment & Plan  - NPO due to NGT.  Holding home meds  - PRN IV hydralazine for elevated pressures        Disposition: inpatient      Past medical history reviewed.    Past Medical History:   Diagnosis Date    Anemia     taking iron    Arrhythmia     had ablation-- no problems since    Arthritis     osteoarthritis knees, fingers    Asthma      no problems except in fall season-- no meds regularly    Chronic pain     back     Gastrointestinal disorder     GERD    GERD (gastroesophageal reflux disease)     controlled with medication    High cholesterol     on medication    Hypertension x4 yrs     controlled with med     Morbid obesity     bmi-42.2 on 5-20-14    Nausea & vomiting     not every time per pt    Other ill-defined conditions(799.89)     hyperlipidemia    Psychiatric disorder     depression    Thyroid disease      hypo-med     Past surgical history reviewed.    Past Surgical History:   Procedure Laterality Date    APPENDECTOMY  1972    removed with Tubal    BREAST REDUCTION SURGERY Bilateral 7/7/2020    BILATERAL BREAST REDUCTION performed by Wu Calero MD at [facility]    CHOLECYSTECTOMY      OR CARDIAC SURG PROCEDURE UNLIST      Ablation 6/2008 for Arrhythmia    ERIK AND BSO      TOTAL KNEE ARTHROPLASTY Right 2010    TOTAL KNEE ARTHROPLASTY Left 2014    TUBAL LIGATION        Allergies   Allergen Reactions    Levofloxacin Other (See Comments)     Pain, weakness in shoulders x 4 months per pt      Social History     Tobacco Use    Smoking status: Never    Smokeless tobacco: Never   Substance Use Topics    Alcohol use: No      Family History   Problem Relation Age of Onset    Emergence Delirium Neg Hx     Post-op Nausea/Vomiting Neg Hx     Delayed Awakening Neg Hx     Pseudochol. Deficiency Neg Hx     Malig Hypertherm Neg Hx     No Known Problems Sister     No Known Problems Sister     Stroke Father     Heart Disease Mother     Other Neg Hx     Post-op Cognitive Dysfunction Neg Hx       Family history reviewed and noncontributory to patient's acute condition; no relevant family history unless otherwise noted above.  Immunization History   Administered Date(s) Administered    PPD Test 06/09/2014     PTA Medications:  Current Outpatient Medications   Medication Instructions    ascorbic acid (VITAMIN C) 500 MG tablet Oral    CALCIUM PO Oral    Coenzyme Q10 10 MG CAPS Oral    ferrous sulfate (IRON 325) 325 (65 Fe) MG tablet Oral,  Status post  LAZ/Cardioversion.  Probe easily inserted at 08:30.  Bubbles infused via peripheral IV at 08:32.  Probe extracted at 08:33.  08:34 Cardioverted twice with 150 Joules to sinus bradycardia.  EKG done.

## 2025-03-25 NOTE — PATIENT PROFILE ADULT - HARM RISK FACTORS
Returned call to patients daughter. I explained again that if patient is in that much pain to report to ED. Patients daughter stated that she is at work and unsure if anyone can take her to the ED. This MA informed her that DARY Howard spoke with him in regards to reviewing imaging, and that he will do so upon completion of rounding at [facility]. Patients daughter feels like the patient is not being taken care of or able to be seen in a timely fashion. She requests a return call from the nurse, upon imaging review.    yes